# Patient Record
Sex: MALE | URBAN - METROPOLITAN AREA
[De-identification: names, ages, dates, MRNs, and addresses within clinical notes are randomized per-mention and may not be internally consistent; named-entity substitution may affect disease eponyms.]

---

## 2021-08-05 ENCOUNTER — TELEPHONE (OUTPATIENT)
Dept: ENDOCRINOLOGY CLINIC | Facility: CLINIC | Age: 46
End: 2021-08-05

## 2021-08-05 NOTE — TELEPHONE ENCOUNTER
Referral received in office 08/03/21 for Dr Sarmad Ramey. No appointment scheduled as of 08/05/21. Please follow up wit patient to schedule new consult. Referral placed in referral folder at . Thank You.

## 2021-10-12 ENCOUNTER — TELEMEDICINE (OUTPATIENT)
Dept: ENDOCRINOLOGY CLINIC | Facility: CLINIC | Age: 46
End: 2021-10-12

## 2021-10-12 DIAGNOSIS — Z79.4 UNCONTROLLED TYPE 2 DIABETES MELLITUS WITH HYPERGLYCEMIA, WITH LONG-TERM CURRENT USE OF INSULIN (HCC): Primary | ICD-10-CM

## 2021-10-12 DIAGNOSIS — E11.65 UNCONTROLLED TYPE 2 DIABETES MELLITUS WITH HYPERGLYCEMIA, WITH LONG-TERM CURRENT USE OF INSULIN (HCC): Primary | ICD-10-CM

## 2021-10-12 PROCEDURE — 99204 OFFICE O/P NEW MOD 45 MIN: CPT | Performed by: NURSE PRACTITIONER

## 2021-10-12 RX ORDER — SEMAGLUTIDE 1.34 MG/ML
0.25 INJECTION, SOLUTION SUBCUTANEOUS WEEKLY
Qty: 4.5 ML | Refills: 0 | Status: SHIPPED | OUTPATIENT
Start: 2021-10-12 | End: 2021-11-22

## 2021-10-12 NOTE — PROGRESS NOTES
This visit is conducted using Telemedicine with live, interactive video and audio. Name: Rene Hernandez  Date: 10/12/2021    Referring Physician: No ref. provider found    CHIEF COMPLAINT   No chief complaint on file.       HISTORY OF PRESENT [place] 12pm: chicken with vege (brocolli or carrots or potatoes or green beans) with fruit (apple or orange or pears or grapes)  Dinner around 4-5pm: fish with vege or chicken with rice   Snack: 1 and 1/2 cups of cereal with 1 cup milk 1% - on occasion     -does (1,000 mg total) by mouth 2 (two) times daily with meals. , Disp: 180 tablet, Rfl: 0  •  Semaglutide,0.25 or 0.5MG/DOS, (OZEMPIC, 0.25 OR 0.5 MG/DOSE,) 2 MG/1.5ML Subcutaneous Solution Pen-injector, Inject 0.25 mg into the skin once a week., Disp: 4.5 mL, R HbA,C: 9.0% on 9/30/21  a) Medications  -continue with Metformin 500mg twice daily   -start taking Jardiance 25mg once daily in AM  -continue with Levemir 50 unit once daily at bedtime   -start Ozempic 0.25mg once weekly - risks and benefits reviewed     - 10/12/2021  MILES Moran    Please note that the following visit was completed using two-way, real-time interactive audio and/or video communication. There are limitations of this visit as no physical exam could be performed.  Every conscious e

## 2021-10-13 RX ORDER — EMPAGLIFLOZIN 25 MG/1
1 TABLET, FILM COATED ORAL DAILY
COMMUNITY
Start: 2021-10-06

## 2021-10-13 RX ORDER — LISINOPRIL 10 MG/1
10 TABLET ORAL DAILY
COMMUNITY
Start: 2021-09-30 | End: 2022-09-25

## 2021-10-13 RX ORDER — PEN NEEDLE, DIABETIC 31 GX5/16"
1 NEEDLE, DISPOSABLE MISCELLANEOUS NIGHTLY
COMMUNITY
Start: 2021-10-01

## 2021-10-13 NOTE — PATIENT INSTRUCTIONS
A1C: 9.0% on 9/30/2021    Medications:   -continue with Metformin 500mg twice daily   -start taking Jardiance 25mg once daily in AM  -continue with Levemir 50 unit once daily at bedtime   -start Ozempic 0.25mg once weekly   Common side effects:    Nausea o

## 2021-11-22 ENCOUNTER — OFFICE VISIT (OUTPATIENT)
Dept: ENDOCRINOLOGY CLINIC | Facility: CLINIC | Age: 46
End: 2021-11-22
Payer: COMMERCIAL

## 2021-11-22 VITALS — SYSTOLIC BLOOD PRESSURE: 153 MMHG | HEART RATE: 74 BPM | WEIGHT: 188 LBS | DIASTOLIC BLOOD PRESSURE: 92 MMHG

## 2021-11-22 DIAGNOSIS — E11.65 UNCONTROLLED TYPE 2 DIABETES MELLITUS WITH HYPERGLYCEMIA, WITH LONG-TERM CURRENT USE OF INSULIN (HCC): Primary | ICD-10-CM

## 2021-11-22 DIAGNOSIS — Z79.4 UNCONTROLLED TYPE 2 DIABETES MELLITUS WITH HYPERGLYCEMIA, WITH LONG-TERM CURRENT USE OF INSULIN (HCC): Primary | ICD-10-CM

## 2021-11-22 PROCEDURE — 3052F HG A1C>EQUAL 8.0%<EQUAL 9.0%: CPT | Performed by: NURSE PRACTITIONER

## 2021-11-22 PROCEDURE — 83036 HEMOGLOBIN GLYCOSYLATED A1C: CPT | Performed by: NURSE PRACTITIONER

## 2021-11-22 PROCEDURE — 82947 ASSAY GLUCOSE BLOOD QUANT: CPT | Performed by: NURSE PRACTITIONER

## 2021-11-22 PROCEDURE — 3077F SYST BP >= 140 MM HG: CPT | Performed by: NURSE PRACTITIONER

## 2021-11-22 PROCEDURE — 36416 COLLJ CAPILLARY BLOOD SPEC: CPT | Performed by: NURSE PRACTITIONER

## 2021-11-22 PROCEDURE — 3080F DIAST BP >= 90 MM HG: CPT | Performed by: NURSE PRACTITIONER

## 2021-11-22 PROCEDURE — 99214 OFFICE O/P EST MOD 30 MIN: CPT | Performed by: NURSE PRACTITIONER

## 2021-11-22 RX ORDER — SEMAGLUTIDE 1.34 MG/ML
0.25 INJECTION, SOLUTION SUBCUTANEOUS WEEKLY
Qty: 4.5 ML | Refills: 0 | Status: SHIPPED | OUTPATIENT
Start: 2021-11-22

## 2021-11-22 NOTE — PROGRESS NOTES
Name: Rene Hernandez  Date: 11/22/2021    CHIEF COMPLAINT   No chief complaint on file. HISTORY OF PRESENT ILLNESS   Rene Hernandez is a 40year old male who presents for follow up on diabetes management. Hgb A1C: 8.0% at [address] today. SYSTEMS  Constitutional: Negative for: weight change, fever, fatigue, cold/heat intolerance  Eyes: Negative for:  Visual changes, proptosis, blurring  ENT: Negative for:  dysphagia, neck swelling, dysphonia  Respiratory: Negative for: hemoptysis, shortness Alcohol use: Not Currently      Drug use: Never   Social History    Socioeconomic History      Marital status:     Tobacco Use      Smoking status: Former Smoker        Quit date: 10/1/1991        Years since quittin.1      Smokeless tobacco: N of the importance of glycemic control and the goals of therapy.   -Discussed with patient glucose targets ranges (Fasting  and post prandial <180).      1.Type 2 Diabetes Mellitus, uncontrolled, with hyperglycemia   -LAB DATA  HbA,C: 8.0% today -->dec verbalizes understanding of these issues and agrees to the plan.     11/22/2021  MILES Elizondo

## 2021-11-22 NOTE — PATIENT INSTRUCTIONS
A1C: 8.0% today -->decreased from 9.0% on 9/30/2021  Blood glucose: 242 in clinic today    Medications:   -continue with Metformin 500mg twice daily   -continue with Jardiance 25mg once daily in AM  -continue with Levemir 50 unit once daily at bedtime   -s

## 2021-11-30 ENCOUNTER — TELEPHONE (OUTPATIENT)
Dept: ENDOCRINOLOGY CLINIC | Facility: CLINIC | Age: 46
End: 2021-11-30

## 2021-11-30 NOTE — TELEPHONE ENCOUNTER
Current Outpatient Medications   Medication Sig Dispense Refill   • Semaglutide,0.25 or 0.5MG/DOS, (OZEMPIC, 0.25 OR 0.5 MG/DOSE,) 2 MG/1.5ML Subcutaneous Solution Pen-injector Inject 0.25 mg into the skin once a week.  4.5 mL 0      Key: [id number]

## 2021-11-30 NOTE — TELEPHONE ENCOUNTER
Medication PA Requested:Semaglutide,0.25 or 0.5MG/DOS, (OZEMPIC, 0.25 OR 0.5 MG/DOSE,) 2 MG/1.5ML Subcutaneous Solution Pen-injector                                                          CoverMyMeds Used:  Key:  Quantity: 4.5 mL  Day Supply: 90  Sig: In

## 2021-12-02 ENCOUNTER — NURSE ONLY (OUTPATIENT)
Dept: ENDOCRINOLOGY CLINIC | Facility: CLINIC | Age: 46
End: 2021-12-02
Payer: COMMERCIAL

## 2021-12-02 NOTE — PATIENT INSTRUCTIONS
1. Eat 45 grams of carbohydrates at each meal   Reduced sandwiches and amount of rice and beans  2. Keep food and blood sugar records for 4 days and bring to next appointment  3.  Carry your blood sugar meter with you at all times and carry treatment for lo

## 2021-12-02 NOTE — PROGRESS NOTES
Lindy [email or web address] attended individual initial assessment for Diabetes Education:    Date: 12/2/2021         Start time: 2:15 pm End time: 2:50 pm    HEMOGLOBIN A1C (%)   Date Value   11/22/2021 8.0 (A)       Assessment:   Sascha Bolden reports he has Active:   Encouraged Physical Activity and briefly reviewed ADA recommended guidelines for activity with type 2 diabetes.         Monitoring:  Instructed/reinforced blood glucose monitoring, testing schedules and target goals:   Fasting / Pre-meal  mg

## 2021-12-13 NOTE — TELEPHONE ENCOUNTER
[facility], [phone], spoke with [facility] SHMUEL LAW/pa rep. She states PA for Ozempic faxed on 12/3 is not seen in her system. She will have the 'senior line' reach out to me since there is no specific form. Gave my phone contact info.    Call ref # Michael [id number]

## 2021-12-14 NOTE — TELEPHONE ENCOUNTER
Received fax from [place] [phone] stating that PA for Shantelle Belle was not processed because they do not process PA for the patient's prescription benefit program.     Routing as nadege.

## 2021-12-14 NOTE — TELEPHONE ENCOUNTER
[id number] Catrachita Kong noted. Since cost would be an ongoing issue, I don't think the 1 month free supply for trulicity would work. Please offer information for [facility] program.     Thank you!

## 2021-12-14 NOTE — TELEPHONE ENCOUNTER
Confirmed Key in initial TE note has [employer] as benefit in Cover My Meds. [employer], [phone], spoke with Jamila Walls, She does not see any private insurance, Jennifer Frederick has [employer] card. \"   Please confirm with patient who there pharm

## 2021-12-14 NOTE — TELEPHONE ENCOUNTER
Spoke to patient regarding PA denial for Ozempic. Patient confirmed that he does not have prescription insurance. Gave him information for Patient assistance program over the phone.  Advised patient to see if he qualifies for the program. Patient stated he

## 2021-12-14 NOTE — TELEPHONE ENCOUNTER
Shirin Heads,     Please advise. I tried to contact patient however wife said he is working. Per chart, patient does not have prescription insurance. Can patient try 1 month free of Trulicity?

## 2021-12-30 ENCOUNTER — NURSE ONLY (OUTPATIENT)
Dept: ENDOCRINOLOGY CLINIC | Facility: CLINIC | Age: 46
End: 2021-12-30
Payer: COMMERCIAL

## 2021-12-30 NOTE — PATIENT INSTRUCTIONS
[email or web address]    Monitor blood sugar daily-in the morning when you wake up or at bedtime when you administer you insulin.     Carry BG meter with you during work hours to assist with low blood sugar treatmen

## 2021-12-30 NOTE — PROGRESS NOTES
Mitchell Cordero  : 1975 attended appointment  for Diabetes Education:    Date: 2021   Start time: 4: 02 pm End time: 4 :27 pm      Patrick Lord came from work and did not provide food records as requested.  He states he is consuming more salads

## 2022-02-23 ENCOUNTER — OFFICE VISIT (OUTPATIENT)
Dept: ENDOCRINOLOGY CLINIC | Facility: CLINIC | Age: 47
End: 2022-02-23
Payer: COMMERCIAL

## 2022-02-23 VITALS
HEART RATE: 78 BPM | HEIGHT: 67 IN | BODY MASS INDEX: 28.88 KG/M2 | DIASTOLIC BLOOD PRESSURE: 85 MMHG | SYSTOLIC BLOOD PRESSURE: 145 MMHG | WEIGHT: 184 LBS

## 2022-02-23 DIAGNOSIS — Z79.4 UNCONTROLLED TYPE 2 DIABETES MELLITUS WITH HYPERGLYCEMIA, WITH LONG-TERM CURRENT USE OF INSULIN (HCC): Primary | ICD-10-CM

## 2022-02-23 DIAGNOSIS — E11.65 UNCONTROLLED TYPE 2 DIABETES MELLITUS WITH HYPERGLYCEMIA, WITH LONG-TERM CURRENT USE OF INSULIN (HCC): Primary | ICD-10-CM

## 2022-02-23 LAB
CARTRIDGE LOT#: ABNORMAL NUMERIC
GLUCOSE BLOOD: 135
HEMOGLOBIN A1C: 7.2 % (ref 4.3–5.6)
TEST STRIP LOT #: NORMAL NUMERIC

## 2022-02-23 PROCEDURE — 3077F SYST BP >= 140 MM HG: CPT | Performed by: NURSE PRACTITIONER

## 2022-02-23 PROCEDURE — 3051F HG A1C>EQUAL 7.0%<8.0%: CPT | Performed by: NURSE PRACTITIONER

## 2022-02-23 PROCEDURE — 83036 HEMOGLOBIN GLYCOSYLATED A1C: CPT | Performed by: NURSE PRACTITIONER

## 2022-02-23 PROCEDURE — 36416 COLLJ CAPILLARY BLOOD SPEC: CPT | Performed by: NURSE PRACTITIONER

## 2022-02-23 PROCEDURE — 82947 ASSAY GLUCOSE BLOOD QUANT: CPT | Performed by: NURSE PRACTITIONER

## 2022-02-23 PROCEDURE — 3079F DIAST BP 80-89 MM HG: CPT | Performed by: NURSE PRACTITIONER

## 2022-02-23 PROCEDURE — 3008F BODY MASS INDEX DOCD: CPT | Performed by: NURSE PRACTITIONER

## 2022-02-23 PROCEDURE — 99214 OFFICE O/P EST MOD 30 MIN: CPT | Performed by: NURSE PRACTITIONER

## 2022-02-23 RX ORDER — CLOTRIMAZOLE 1 %
1 CREAM (GRAM) TOPICAL 2 TIMES DAILY
Qty: 45 G | Refills: 1 | Status: SHIPPED | OUTPATIENT
Start: 2022-02-23

## 2022-02-23 RX ORDER — GLIPIZIDE 5 MG/1
5 TABLET ORAL
Qty: 90 TABLET | Refills: 0 | Status: SHIPPED | OUTPATIENT
Start: 2022-02-23 | End: 2022-05-24

## 2022-02-23 NOTE — PATIENT INSTRUCTIONS
A1C: 7.2% today -->decreased from 8.0% on 11/22/2021  Blood glucose: 135 in clinic today    Medications:   -continue with Metformin 1,000mg twice daily   -continue with Jardiance 25mg once daily in AM  -continue with Levemir 50 unit once daily at bedtime   -start glipizide 5mg with dinner meal    - if you eat a small dinner or no dinner, then do not take glipizide     A1C goal:  <7.0%    Blood sugar testing:  Test your blood sugar 1 time daily   Recommended times to test: Before breakfast (fasting) or before lunch or before dinner     Blood sugar targets:  Before breakfast:   (preferably < 110)  Before meals OR 2 hours after meals: <150    Call for persistent blood sugars < 75 or > 200.

## 2022-05-23 ENCOUNTER — OFFICE VISIT (OUTPATIENT)
Dept: ENDOCRINOLOGY CLINIC | Facility: CLINIC | Age: 47
End: 2022-05-23
Payer: COMMERCIAL

## 2022-05-23 VITALS
WEIGHT: 185.5 LBS | DIASTOLIC BLOOD PRESSURE: 100 MMHG | HEART RATE: 74 BPM | SYSTOLIC BLOOD PRESSURE: 160 MMHG | BODY MASS INDEX: 29 KG/M2

## 2022-05-23 DIAGNOSIS — Z79.4 UNCONTROLLED TYPE 2 DIABETES MELLITUS WITH HYPERGLYCEMIA, WITH LONG-TERM CURRENT USE OF INSULIN (HCC): Primary | ICD-10-CM

## 2022-05-23 DIAGNOSIS — E11.65 UNCONTROLLED TYPE 2 DIABETES MELLITUS WITH HYPERGLYCEMIA, WITH LONG-TERM CURRENT USE OF INSULIN (HCC): Primary | ICD-10-CM

## 2022-05-23 DIAGNOSIS — I10 PRIMARY HYPERTENSION: ICD-10-CM

## 2022-05-23 LAB
CARTRIDGE LOT#: ABNORMAL NUMERIC
GLUCOSE BLOOD: 255
HEMOGLOBIN A1C: 7.4 % (ref 4.3–5.6)
TEST STRIP LOT #: NORMAL NUMERIC

## 2022-05-23 RX ORDER — LISINOPRIL 20 MG/1
20 TABLET ORAL DAILY
Qty: 90 TABLET | Refills: 0 | Status: SHIPPED | OUTPATIENT
Start: 2022-05-23 | End: 2022-08-21

## 2022-05-23 NOTE — PATIENT INSTRUCTIONS
A1C: 7.4% today --> slight increase from 7.2% on 2/23/2022  Blood glucose: 255 in clinic today    Medications:   -continue with Metformin 1,000mg twice daily   -continue with Jardiance 25mg once daily in AM  -continue with Levemir 50 unit once daily at bedtime   -start glipizide 2.5mg (1/2 tablet) with dinner   -increase lisinopril 10-->20mg once daily in AM    -Switch regular white bread for whole wheat bread   -switch regular cookies for sugar free cookies    Weight:  Wt Readings from Last 6 Encounters:  05/23/22 : 185 lb 8 oz (84.1 kg)  02/23/22 : 184 lb (83.5 kg)  11/22/21 : 188 lb (85.3 kg)    A1C goal:  <7.0%    Blood sugar testing:  Test your blood sugar 1 time daily   Recommended times to test: alternate with before breakfast (fasting) or before dinner or bedtime    Blood sugar targets:  Before breakfast:   (preferably < 110)  Before meals OR 2 hours after meals: <180 (preferably <150)     Call for persistent blood sugars < 75 or > 200

## 2022-07-18 RX ORDER — LISINOPRIL 20 MG/1
TABLET ORAL
Qty: 90 TABLET | Refills: 0 | Status: SHIPPED | OUTPATIENT
Start: 2022-07-18

## 2022-08-24 ENCOUNTER — OFFICE VISIT (OUTPATIENT)
Dept: ENDOCRINOLOGY CLINIC | Facility: CLINIC | Age: 47
End: 2022-08-24
Payer: MEDICAID

## 2022-08-24 VITALS
HEIGHT: 67 IN | DIASTOLIC BLOOD PRESSURE: 82 MMHG | BODY MASS INDEX: 28.72 KG/M2 | HEART RATE: 70 BPM | WEIGHT: 183 LBS | SYSTOLIC BLOOD PRESSURE: 124 MMHG

## 2022-08-24 DIAGNOSIS — Z79.4 UNCONTROLLED TYPE 2 DIABETES MELLITUS WITH HYPERGLYCEMIA, WITH LONG-TERM CURRENT USE OF INSULIN (HCC): Primary | ICD-10-CM

## 2022-08-24 DIAGNOSIS — E11.65 UNCONTROLLED TYPE 2 DIABETES MELLITUS WITH HYPERGLYCEMIA, WITH LONG-TERM CURRENT USE OF INSULIN (HCC): Primary | ICD-10-CM

## 2022-08-24 LAB
CARTRIDGE LOT#: ABNORMAL NUMERIC
HEMOGLOBIN A1C: 8.2 % (ref 4.3–5.6)

## 2022-08-24 PROCEDURE — 83036 HEMOGLOBIN GLYCOSYLATED A1C: CPT | Performed by: NURSE PRACTITIONER

## 2022-08-24 PROCEDURE — 3008F BODY MASS INDEX DOCD: CPT | Performed by: NURSE PRACTITIONER

## 2022-08-24 PROCEDURE — 3079F DIAST BP 80-89 MM HG: CPT | Performed by: NURSE PRACTITIONER

## 2022-08-24 PROCEDURE — 3074F SYST BP LT 130 MM HG: CPT | Performed by: NURSE PRACTITIONER

## 2022-08-24 PROCEDURE — 99214 OFFICE O/P EST MOD 30 MIN: CPT | Performed by: NURSE PRACTITIONER

## 2022-08-24 PROCEDURE — 3052F HG A1C>EQUAL 8.0%<EQUAL 9.0%: CPT | Performed by: NURSE PRACTITIONER

## 2022-08-24 RX ORDER — DULAGLUTIDE 1.5 MG/.5ML
1.5 INJECTION, SOLUTION SUBCUTANEOUS WEEKLY
Qty: 2 ML | Refills: 0 | Status: SHIPPED | OUTPATIENT
Start: 2022-08-24

## 2022-08-24 RX ORDER — DULAGLUTIDE 0.75 MG/.5ML
0.75 INJECTION, SOLUTION SUBCUTANEOUS WEEKLY
Qty: 2 ML | Refills: 0 | Status: SHIPPED | OUTPATIENT
Start: 2022-08-24

## 2022-08-24 RX ORDER — GLIPIZIDE 5 MG/1
5 TABLET ORAL NIGHTLY
COMMUNITY
Start: 2022-08-01 | End: 2022-08-24

## 2022-08-24 NOTE — PATIENT INSTRUCTIONS
A1C: 8.2% today -->increased from 7.4% on 5/23/2022    Medications:   -continue with Metformin 1,000mg twice daily   -continue with Jardiance 25mg once daily in AM  -continue with Levemir 50 unit once daily at bedtime   -start Trulicity 2.60WP once weekly for 4 weeks, then increase 1.5mg one weekly       Weight:  Wt Readings from Last 6 Encounters:  08/24/22 : 183 lb (83 kg)  05/23/22 : 185 lb 8 oz (84.1 kg)  02/23/22 : 184 lb (83.5 kg)  11/22/21 : 188 lb (85.3 kg)    A1C goal:  <7.0%    Blood sugar testing:  Test your blood sugar 1 time daily   Recommended times to test: Before breakfast (fasting) or 2hrs after meals     Blood sugar targets:  Before breakfast:   (preferably < 110)  Before meals OR 2 hours after meals: <150    Call for persistent blood sugars < 75 or > 200

## 2022-08-30 ENCOUNTER — TELEPHONE (OUTPATIENT)
Dept: ENDOCRINOLOGY CLINIC | Facility: CLINIC | Age: 47
End: 2022-08-30

## 2022-09-20 RX ORDER — DULAGLUTIDE 0.75 MG/.5ML
INJECTION, SOLUTION SUBCUTANEOUS
Qty: 2 ML | Refills: 0 | Status: SHIPPED | OUTPATIENT
Start: 2022-09-20

## 2022-10-14 RX ORDER — DULAGLUTIDE 0.75 MG/.5ML
INJECTION, SOLUTION SUBCUTANEOUS
Qty: 2 ML | Refills: 0 | Status: SHIPPED | OUTPATIENT
Start: 2022-10-14

## 2022-10-24 ENCOUNTER — OFFICE VISIT (OUTPATIENT)
Dept: ENDOCRINOLOGY CLINIC | Facility: CLINIC | Age: 47
End: 2022-10-24
Payer: MEDICAID

## 2022-10-24 VITALS
HEART RATE: 83 BPM | WEIGHT: 184 LBS | SYSTOLIC BLOOD PRESSURE: 129 MMHG | BODY MASS INDEX: 29 KG/M2 | DIASTOLIC BLOOD PRESSURE: 83 MMHG

## 2022-10-24 DIAGNOSIS — E11.9 TYPE 2 DIABETES MELLITUS WITHOUT COMPLICATION, WITH LONG-TERM CURRENT USE OF INSULIN (HCC): Primary | ICD-10-CM

## 2022-10-24 DIAGNOSIS — Z79.4 TYPE 2 DIABETES MELLITUS WITHOUT COMPLICATION, WITH LONG-TERM CURRENT USE OF INSULIN (HCC): Primary | ICD-10-CM

## 2022-10-24 DIAGNOSIS — E78.5 HYPERLIPIDEMIA, UNSPECIFIED HYPERLIPIDEMIA TYPE: ICD-10-CM

## 2022-10-24 LAB
CARTRIDGE LOT#: NORMAL NUMERIC
GLUCOSE BLOOD: 117
TEST STRIP LOT #: NORMAL NUMERIC

## 2022-10-24 PROCEDURE — 99214 OFFICE O/P EST MOD 30 MIN: CPT | Performed by: NURSE PRACTITIONER

## 2022-10-24 PROCEDURE — 3079F DIAST BP 80-89 MM HG: CPT | Performed by: NURSE PRACTITIONER

## 2022-10-24 PROCEDURE — 83036 HEMOGLOBIN GLYCOSYLATED A1C: CPT | Performed by: NURSE PRACTITIONER

## 2022-10-24 PROCEDURE — 3074F SYST BP LT 130 MM HG: CPT | Performed by: NURSE PRACTITIONER

## 2022-10-24 PROCEDURE — 82947 ASSAY GLUCOSE BLOOD QUANT: CPT | Performed by: NURSE PRACTITIONER

## 2022-10-24 PROCEDURE — 3044F HG A1C LEVEL LT 7.0%: CPT | Performed by: NURSE PRACTITIONER

## 2022-10-24 RX ORDER — EMPAGLIFLOZIN 25 MG/1
1 TABLET, FILM COATED ORAL DAILY
Qty: 90 TABLET | Refills: 0 | Status: SHIPPED | OUTPATIENT
Start: 2022-10-24

## 2022-10-24 RX ORDER — DULAGLUTIDE 0.75 MG/.5ML
0.75 INJECTION, SOLUTION SUBCUTANEOUS WEEKLY
Qty: 6 ML | Refills: 0 | Status: SHIPPED | OUTPATIENT
Start: 2022-10-24

## 2023-01-16 RX ORDER — DULAGLUTIDE 0.75 MG/.5ML
0.75 INJECTION, SOLUTION SUBCUTANEOUS WEEKLY
Qty: 6 ML | Refills: 0 | Status: SHIPPED | OUTPATIENT
Start: 2023-01-16

## 2023-01-16 NOTE — TELEPHONE ENCOUNTER
LOV: 10/24/22    Future Appointments   Date Time Provider Monika Lombardo   1/25/2023  2:30 PM MILES MathisO JUNE MCMULLENO

## 2023-01-25 ENCOUNTER — OFFICE VISIT (OUTPATIENT)
Dept: ENDOCRINOLOGY CLINIC | Facility: CLINIC | Age: 48
End: 2023-01-25

## 2023-01-25 VITALS
WEIGHT: 183 LBS | SYSTOLIC BLOOD PRESSURE: 135 MMHG | DIASTOLIC BLOOD PRESSURE: 85 MMHG | HEART RATE: 79 BPM | BODY MASS INDEX: 29 KG/M2

## 2023-01-25 DIAGNOSIS — E11.65 TYPE 2 DIABETES MELLITUS WITH HYPERGLYCEMIA, WITH LONG-TERM CURRENT USE OF INSULIN (HCC): Primary | ICD-10-CM

## 2023-01-25 DIAGNOSIS — Z79.4 TYPE 2 DIABETES MELLITUS WITH HYPERGLYCEMIA, WITH LONG-TERM CURRENT USE OF INSULIN (HCC): Primary | ICD-10-CM

## 2023-01-25 LAB
CARTRIDGE LOT#: ABNORMAL NUMERIC
GLUCOSE BLOOD: 135
HEMOGLOBIN A1C: 6.5 % (ref 4.3–5.6)
TEST STRIP LOT #: NORMAL NUMERIC

## 2023-01-25 PROCEDURE — 99214 OFFICE O/P EST MOD 30 MIN: CPT | Performed by: NURSE PRACTITIONER

## 2023-01-25 PROCEDURE — 3044F HG A1C LEVEL LT 7.0%: CPT | Performed by: NURSE PRACTITIONER

## 2023-01-25 PROCEDURE — 3075F SYST BP GE 130 - 139MM HG: CPT | Performed by: NURSE PRACTITIONER

## 2023-01-25 PROCEDURE — 3079F DIAST BP 80-89 MM HG: CPT | Performed by: NURSE PRACTITIONER

## 2023-01-25 PROCEDURE — 83036 HEMOGLOBIN GLYCOSYLATED A1C: CPT | Performed by: NURSE PRACTITIONER

## 2023-01-25 PROCEDURE — 82947 ASSAY GLUCOSE BLOOD QUANT: CPT | Performed by: NURSE PRACTITIONER

## 2023-01-25 RX ORDER — LANCETS 33 GAUGE
1 EACH MISCELLANEOUS 2 TIMES DAILY
Qty: 200 EACH | Refills: 1 | Status: SHIPPED | OUTPATIENT
Start: 2023-01-25 | End: 2023-01-27

## 2023-01-25 RX ORDER — DULAGLUTIDE 1.5 MG/.5ML
1.5 INJECTION, SOLUTION SUBCUTANEOUS WEEKLY
Qty: 6 ML | Refills: 0 | Status: SHIPPED | OUTPATIENT
Start: 2023-02-15

## 2023-01-25 RX ORDER — BLOOD SUGAR DIAGNOSTIC
STRIP MISCELLANEOUS
Qty: 200 STRIP | Refills: 1 | Status: SHIPPED | OUTPATIENT
Start: 2023-01-25 | End: 2023-01-27

## 2023-01-25 RX ORDER — LOVASTATIN 10 MG/1
10 TABLET ORAL NIGHTLY
COMMUNITY
Start: 2022-12-14 | End: 2023-03-14

## 2023-01-25 NOTE — PATIENT INSTRUCTIONS
A1C: 6.5% today -->slight decrease from 6.7% on 10/24/2022  Blood glucose: 135 in clinic today  Endocrinology fax# [phone]    Medications:   -continue with Metformin 1,000mg twice daily   -continue with Jardiance 25mg once daily in AM  -continue with Levemir 50 unit once daily at bedtime   -increase Trulicity 4.38 -->5.0DM once weekly on wednesdays     A1C goal:  <7.0%    Blood sugar testing:  Test your blood sugar 1 time daily   Recommended times to test: alternate with before breakfast (fasting) or before dinner or bedtime     Blood sugar targets:  Before breakfast:   (preferably < 110)  Before meals OR 2 hours after meals: <150    Call for persistent blood sugars < 75 or > 200

## 2023-01-27 ENCOUNTER — TELEPHONE (OUTPATIENT)
Dept: ENDOCRINOLOGY CLINIC | Facility: CLINIC | Age: 48
End: 2023-01-27

## 2023-01-27 RX ORDER — LANCETS 33 GAUGE
1 EACH MISCELLANEOUS 2 TIMES DAILY
Qty: 200 EACH | Refills: 1 | Status: SHIPPED | OUTPATIENT
Start: 2023-01-27

## 2023-01-27 RX ORDER — BLOOD SUGAR DIAGNOSTIC
STRIP MISCELLANEOUS
Qty: 200 STRIP | Refills: 1 | Status: SHIPPED | OUTPATIENT
Start: 2023-01-27

## 2023-02-20 ENCOUNTER — TELEPHONE (OUTPATIENT)
Dept: ENDOCRINOLOGY CLINIC | Facility: CLINIC | Age: 48
End: 2023-02-20

## 2023-02-20 RX ORDER — EMPAGLIFLOZIN 25 MG/1
1 TABLET, FILM COATED ORAL DAILY
Qty: 90 TABLET | Refills: 1 | Status: SHIPPED | OUTPATIENT
Start: 2023-02-20

## 2023-02-20 NOTE — TELEPHONE ENCOUNTER
Called patient to confirm what medication he needs as there are other meds list as below. Pt states he just needs jardiance. Pended script for 90 days + 1 refill.

## 2023-03-24 RX ORDER — DULAGLUTIDE 1.5 MG/.5ML
INJECTION, SOLUTION SUBCUTANEOUS
Qty: 6 ML | Refills: 0 | Status: SHIPPED | OUTPATIENT
Start: 2023-03-24

## 2023-04-24 ENCOUNTER — TELEPHONE (OUTPATIENT)
Dept: ENDOCRINOLOGY CLINIC | Facility: CLINIC | Age: 48
End: 2023-04-24

## 2023-04-24 DIAGNOSIS — Z79.4 TYPE 2 DIABETES MELLITUS WITH HYPERGLYCEMIA, WITH LONG-TERM CURRENT USE OF INSULIN (HCC): Primary | ICD-10-CM

## 2023-04-24 DIAGNOSIS — E11.65 TYPE 2 DIABETES MELLITUS WITH HYPERGLYCEMIA, WITH LONG-TERM CURRENT USE OF INSULIN (HCC): Primary | ICD-10-CM

## 2023-04-24 NOTE — TELEPHONE ENCOUNTER
Spoke to patient to advise fasting lab orders in system to be completed prior to f/u on 4/26/23 - patient stated understanding and will try to go to lab in [place] tomorrow (hours reviewed with patient)

## 2023-04-26 ENCOUNTER — LAB ENCOUNTER (OUTPATIENT)
Dept: LAB | Age: 48
End: 2023-04-26
Attending: NURSE PRACTITIONER
Payer: MEDICAID

## 2023-04-26 ENCOUNTER — OFFICE VISIT (OUTPATIENT)
Dept: ENDOCRINOLOGY CLINIC | Facility: CLINIC | Age: 48
End: 2023-04-26

## 2023-04-26 VITALS
BODY MASS INDEX: 29 KG/M2 | DIASTOLIC BLOOD PRESSURE: 85 MMHG | WEIGHT: 185 LBS | SYSTOLIC BLOOD PRESSURE: 143 MMHG | HEART RATE: 78 BPM

## 2023-04-26 DIAGNOSIS — Z79.4 TYPE 2 DIABETES MELLITUS WITH HYPERGLYCEMIA, WITH LONG-TERM CURRENT USE OF INSULIN (HCC): ICD-10-CM

## 2023-04-26 DIAGNOSIS — E11.9 TYPE 2 DIABETES MELLITUS WITHOUT COMPLICATION, WITH LONG-TERM CURRENT USE OF INSULIN (HCC): Primary | ICD-10-CM

## 2023-04-26 DIAGNOSIS — E11.65 TYPE 2 DIABETES MELLITUS WITH HYPERGLYCEMIA, WITH LONG-TERM CURRENT USE OF INSULIN (HCC): ICD-10-CM

## 2023-04-26 DIAGNOSIS — Z79.4 TYPE 2 DIABETES MELLITUS WITHOUT COMPLICATION, WITH LONG-TERM CURRENT USE OF INSULIN (HCC): Primary | ICD-10-CM

## 2023-04-26 LAB
CHOLEST SERPL-MCNC: 108 MG/DL (ref ?–200)
EST. AVERAGE GLUCOSE BLD GHB EST-MCNC: 131 MG/DL (ref 68–126)
FASTING PATIENT LIPID ANSWER: YES
GLUCOSE BLOOD: 140
HBA1C MFR BLD: 6.2 % (ref ?–5.7)
HDLC SERPL-MCNC: 33 MG/DL (ref 40–59)
LDLC SERPL CALC-MCNC: 61 MG/DL (ref ?–100)
NONHDLC SERPL-MCNC: 75 MG/DL (ref ?–130)
TEST STRIP LOT #: NORMAL NUMERIC
TRIGL SERPL-MCNC: 62 MG/DL (ref 30–149)
VLDLC SERPL CALC-MCNC: 9 MG/DL (ref 0–30)

## 2023-04-26 PROCEDURE — 82947 ASSAY GLUCOSE BLOOD QUANT: CPT | Performed by: NURSE PRACTITIONER

## 2023-04-26 PROCEDURE — 3044F HG A1C LEVEL LT 7.0%: CPT | Performed by: NURSE PRACTITIONER

## 2023-04-26 PROCEDURE — 83036 HEMOGLOBIN GLYCOSYLATED A1C: CPT

## 2023-04-26 PROCEDURE — 3077F SYST BP >= 140 MM HG: CPT | Performed by: NURSE PRACTITIONER

## 2023-04-26 PROCEDURE — 99213 OFFICE O/P EST LOW 20 MIN: CPT | Performed by: NURSE PRACTITIONER

## 2023-04-26 PROCEDURE — 80061 LIPID PANEL: CPT

## 2023-04-26 PROCEDURE — 3079F DIAST BP 80-89 MM HG: CPT | Performed by: NURSE PRACTITIONER

## 2023-04-26 PROCEDURE — 36415 COLL VENOUS BLD VENIPUNCTURE: CPT

## 2023-04-26 RX ORDER — EMPAGLIFLOZIN 25 MG/1
1 TABLET, FILM COATED ORAL DAILY
Qty: 90 TABLET | Refills: 1 | Status: SHIPPED | OUTPATIENT
Start: 2023-04-26

## 2023-04-26 RX ORDER — LISINOPRIL 20 MG/1
20 TABLET ORAL DAILY
Qty: 90 TABLET | Refills: 1 | Status: SHIPPED | OUTPATIENT
Start: 2023-04-26

## 2023-04-26 NOTE — PATIENT INSTRUCTIONS
A1C: 6.2% today -->decreased 6.5% on 1/25/2023  Blood glucose: 140 in clinic today    Medications:   -continue with Metformin 1,000mg twice daily   -continue with Jardiance 25mg once daily in AM  -decrease Levemir 50-->46 unit once daily at bedtime   - continue with Trulicity 7.5HA once weekly on wednesdays    Weight:  Wt Readings from Last 6 Encounters:  04/26/23 : 185 lb (83.9 kg)  01/25/23 : 183 lb (83 kg)  10/24/22 : 184 lb (83.5 kg)  08/24/22 : 183 lb (83 kg)  05/23/22 : 185 lb 8 oz (84.1 kg)  02/23/22 : 184 lb (83.5 kg)    A1C goal:  <7.0%    Blood sugar testing:  Test your blood sugar 1 time daily   Recommended times to test: alternate with before breakfast (fasting) or 2hrs after meals     Blood sugar targets:  Before breakfast:  (preferably < 110)  2 hours after meals: <150    Call for persistent blood sugars < 75 or > 200

## 2023-05-24 RX ORDER — LANCETS 33 GAUGE
EACH MISCELLANEOUS
Qty: 200 EACH | Refills: 1 | Status: SHIPPED | OUTPATIENT
Start: 2023-05-24

## 2023-07-10 ENCOUNTER — TELEPHONE (OUTPATIENT)
Dept: ENDOCRINOLOGY CLINIC | Facility: CLINIC | Age: 48
End: 2023-07-10

## 2023-08-09 RX ORDER — DULAGLUTIDE 0.75 MG/.5ML
0.75 INJECTION, SOLUTION SUBCUTANEOUS
Qty: 6 ML | Refills: 0 | OUTPATIENT
Start: 2023-08-09

## 2023-08-28 ENCOUNTER — OFFICE VISIT (OUTPATIENT)
Dept: ENDOCRINOLOGY CLINIC | Facility: CLINIC | Age: 48
End: 2023-08-28

## 2023-08-28 VITALS
HEART RATE: 83 BPM | WEIGHT: 186 LBS | HEIGHT: 67 IN | BODY MASS INDEX: 29.19 KG/M2 | SYSTOLIC BLOOD PRESSURE: 119 MMHG | DIASTOLIC BLOOD PRESSURE: 81 MMHG

## 2023-08-28 DIAGNOSIS — E78.5 HYPERLIPIDEMIA, UNSPECIFIED HYPERLIPIDEMIA TYPE: ICD-10-CM

## 2023-08-28 DIAGNOSIS — Z79.4 TYPE 2 DIABETES MELLITUS WITHOUT COMPLICATION, WITH LONG-TERM CURRENT USE OF INSULIN (HCC): Primary | ICD-10-CM

## 2023-08-28 DIAGNOSIS — E11.9 TYPE 2 DIABETES MELLITUS WITHOUT COMPLICATION, WITH LONG-TERM CURRENT USE OF INSULIN (HCC): Primary | ICD-10-CM

## 2023-08-28 PROCEDURE — 3074F SYST BP LT 130 MM HG: CPT | Performed by: NURSE PRACTITIONER

## 2023-08-28 PROCEDURE — 83036 HEMOGLOBIN GLYCOSYLATED A1C: CPT | Performed by: NURSE PRACTITIONER

## 2023-08-28 PROCEDURE — 3044F HG A1C LEVEL LT 7.0%: CPT | Performed by: NURSE PRACTITIONER

## 2023-08-28 PROCEDURE — 3079F DIAST BP 80-89 MM HG: CPT | Performed by: NURSE PRACTITIONER

## 2023-08-28 PROCEDURE — 99214 OFFICE O/P EST MOD 30 MIN: CPT | Performed by: NURSE PRACTITIONER

## 2023-08-28 PROCEDURE — 3008F BODY MASS INDEX DOCD: CPT | Performed by: NURSE PRACTITIONER

## 2023-08-28 PROCEDURE — 82947 ASSAY GLUCOSE BLOOD QUANT: CPT | Performed by: NURSE PRACTITIONER

## 2023-08-28 RX ORDER — DULAGLUTIDE 3 MG/.5ML
3 INJECTION, SOLUTION SUBCUTANEOUS WEEKLY
Qty: 6 ML | Refills: 0 | Status: SHIPPED | OUTPATIENT
Start: 2023-08-28

## 2023-08-28 RX ORDER — METFORMIN HYDROCHLORIDE 500 MG/1
500 TABLET, EXTENDED RELEASE ORAL 2 TIMES DAILY WITH MEALS
Qty: 180 TABLET | Refills: 0 | Status: SHIPPED | OUTPATIENT
Start: 2023-08-28 | End: 2023-11-26

## 2023-08-28 NOTE — PATIENT INSTRUCTIONS
A1C: 6.5% today; previously was 6.2% on 4/26/2023  Blood glucose: 135 in clinic today  Endocrinology fax# [phone]    Medications:   -decrease Metformin ER 500mg twice daily   -continue with Jardiance 25mg once daily - in morning   -continue with Levemir 46 unit once daily at bedtime   -increase Trulicity 1.5 --> 3 mg once weekly     - repeat lipid panel (cholesterol) in 4 months - fasting 10-12hrs    -before next follow up visit     -increase walking to 30-40 mins daily after dinner meal     Weight:  Wt Readings from Last 6 Encounters:  08/28/23 : 186 lb (84.4 kg)  04/26/23 : 185 lb (83.9 kg)  01/25/23 : 183 lb (83 kg)  10/24/22 : 184 lb (83.5 kg)  08/24/22 : 183 lb (83 kg)  05/23/22 : 185 lb 8 oz (84.1 kg)    A1C goal:  <7.0%    Blood sugar testing:  Test your blood sugar 1-3 times daily   Recommended times to test: Before breakfast (fasting) or 2hrs after meals     Blood sugar targets:  Before breakfast:   (preferably < 110)  Before meals OR 2 hours after meals: <180 (preferably <150)     Call for persistent blood sugars < 75 or > 200

## 2023-08-29 RX ORDER — LANCETS 33 GAUGE
1 EACH MISCELLANEOUS 2 TIMES DAILY
Qty: 200 EACH | Refills: 1 | Status: SHIPPED | OUTPATIENT
Start: 2023-08-29

## 2023-11-24 RX ORDER — METFORMIN HYDROCHLORIDE 500 MG/1
500 TABLET, EXTENDED RELEASE ORAL 2 TIMES DAILY WITH MEALS
Qty: 180 TABLET | Refills: 0 | Status: SHIPPED | OUTPATIENT
Start: 2023-11-24

## 2023-11-24 NOTE — TELEPHONE ENCOUNTER
LOV: 08/28/2023    RTC:4  Months    FU: 12/27/2023    Last Refill: 08/28/2023    3 Month Supply Pending      Please approve if applicable

## 2023-12-26 ENCOUNTER — LAB ENCOUNTER (OUTPATIENT)
Dept: LAB | Age: 48
End: 2023-12-26
Attending: NURSE PRACTITIONER
Payer: MEDICAID

## 2023-12-26 DIAGNOSIS — E11.9 TYPE 2 DIABETES MELLITUS WITHOUT COMPLICATION, WITH LONG-TERM CURRENT USE OF INSULIN (HCC): ICD-10-CM

## 2023-12-26 DIAGNOSIS — Z79.4 TYPE 2 DIABETES MELLITUS WITHOUT COMPLICATION, WITH LONG-TERM CURRENT USE OF INSULIN (HCC): ICD-10-CM

## 2023-12-26 LAB
CHOLEST SERPL-MCNC: 142 MG/DL (ref ?–200)
FASTING PATIENT LIPID ANSWER: NO
HDLC SERPL-MCNC: 31 MG/DL (ref 40–59)
LDLC SERPL CALC-MCNC: 72 MG/DL (ref ?–100)
NONHDLC SERPL-MCNC: 111 MG/DL (ref ?–130)
TRIGL SERPL-MCNC: 237 MG/DL (ref 30–149)
VLDLC SERPL CALC-MCNC: 36 MG/DL (ref 0–30)

## 2023-12-26 PROCEDURE — 80061 LIPID PANEL: CPT

## 2023-12-26 PROCEDURE — 36415 COLL VENOUS BLD VENIPUNCTURE: CPT

## 2023-12-27 ENCOUNTER — OFFICE VISIT (OUTPATIENT)
Dept: ENDOCRINOLOGY CLINIC | Facility: CLINIC | Age: 48
End: 2023-12-27

## 2023-12-27 VITALS
WEIGHT: 185 LBS | HEART RATE: 73 BPM | SYSTOLIC BLOOD PRESSURE: 133 MMHG | BODY MASS INDEX: 29 KG/M2 | DIASTOLIC BLOOD PRESSURE: 84 MMHG

## 2023-12-27 DIAGNOSIS — Z79.4 TYPE 2 DIABETES MELLITUS WITHOUT COMPLICATION, WITH LONG-TERM CURRENT USE OF INSULIN (HCC): Primary | ICD-10-CM

## 2023-12-27 DIAGNOSIS — E11.9 TYPE 2 DIABETES MELLITUS WITHOUT COMPLICATION, WITH LONG-TERM CURRENT USE OF INSULIN (HCC): Primary | ICD-10-CM

## 2023-12-27 LAB
CARTRIDGE LOT#: ABNORMAL NUMERIC
GLUCOSE BLOOD: 103
HEMOGLOBIN A1C: 6.5 % (ref 4.3–5.6)
TEST STRIP LOT #: NORMAL NUMERIC

## 2023-12-27 PROCEDURE — 3079F DIAST BP 80-89 MM HG: CPT | Performed by: NURSE PRACTITIONER

## 2023-12-27 PROCEDURE — 3075F SYST BP GE 130 - 139MM HG: CPT | Performed by: NURSE PRACTITIONER

## 2023-12-27 PROCEDURE — 99213 OFFICE O/P EST LOW 20 MIN: CPT | Performed by: NURSE PRACTITIONER

## 2023-12-27 PROCEDURE — 83036 HEMOGLOBIN GLYCOSYLATED A1C: CPT | Performed by: NURSE PRACTITIONER

## 2023-12-27 PROCEDURE — 82947 ASSAY GLUCOSE BLOOD QUANT: CPT | Performed by: NURSE PRACTITIONER

## 2023-12-27 PROCEDURE — 3044F HG A1C LEVEL LT 7.0%: CPT | Performed by: NURSE PRACTITIONER

## 2023-12-27 NOTE — PATIENT INSTRUCTIONS
A1C: 6.5% today --> previously was 6.5% on 8/28/2023  Blood glucose: 103 in clinic today    Medications:   - continue with Metformin ER 500mg twice daily   -continue with Jardiance 25mg once daily - in morning   -continue with Levemir 46 unit once daily at bedtime   - continue with Trulicity 3 mg once weekly     - continue to follow a low carb diet   - continue to stay active as much as safely able     Weight:  Wt Readings from Last 6 Encounters:   12/27/23 185 lb (83.9 kg)   08/28/23 186 lb (84.4 kg)   04/26/23 185 lb (83.9 kg)   01/25/23 183 lb (83 kg)   10/24/22 184 lb (83.5 kg)   08/24/22 183 lb (83 kg)     A1C goal:  <7.0%    Blood sugar testing:  Test your blood sugar 1 time daily   Recommended times to test: alternate with before breakfast (fasting) or 2hrs after meals      Blood sugar targets:  Before breakfast:   (preferably < 110)  Before meals OR 2 hours after meals: <150    Call for persistent blood sugars < 75 or > 200

## 2024-01-04 RX ORDER — METFORMIN HYDROCHLORIDE 500 MG/1
500 TABLET, EXTENDED RELEASE ORAL 2 TIMES DAILY WITH MEALS
Qty: 180 TABLET | Refills: 0 | Status: SHIPPED | OUTPATIENT
Start: 2024-01-04

## 2024-01-04 RX ORDER — PEN NEEDLE, DIABETIC 31 GX5/16"
1 NEEDLE, DISPOSABLE MISCELLANEOUS NIGHTLY
Qty: 200 EACH | Refills: 2 | Status: SHIPPED | OUTPATIENT
Start: 2024-01-04

## 2024-01-04 RX ORDER — DULAGLUTIDE 3 MG/.5ML
3 INJECTION, SOLUTION SUBCUTANEOUS WEEKLY
Qty: 6 ML | Refills: 0 | Status: SHIPPED | OUTPATIENT
Start: 2024-01-04

## 2024-01-04 RX ORDER — EMPAGLIFLOZIN 25 MG/1
1 TABLET, FILM COATED ORAL DAILY
Qty: 90 TABLET | Refills: 1 | Status: SHIPPED | OUTPATIENT
Start: 2024-01-04

## 2024-01-04 NOTE — TELEPHONE ENCOUNTER
Pt is requesting for refills on Trulicity and needles for Levemir, metformin 500 mg, jardiance 25 mg, levermir running low please follow up

## 2024-01-04 NOTE — TELEPHONE ENCOUNTER
Rx request, please review and sign off if appropriate. Thank you.     Last seen: 12/27/23  Return to clinic: 6  months

## 2024-01-11 ENCOUNTER — TELEPHONE (OUTPATIENT)
Dept: ENDOCRINOLOGY CLINIC | Facility: CLINIC | Age: 49
End: 2024-01-11

## 2024-01-11 NOTE — TELEPHONE ENCOUNTER
Patient states pharmacy does not have Trulicity and Levemir - pt wants to know what to do.  Please call.  Thank you.

## 2024-01-12 RX ORDER — INSULIN GLARGINE 100 [IU]/ML
46 INJECTION, SOLUTION SUBCUTANEOUS NIGHTLY
Qty: 42 ML | Refills: 1 | Status: SHIPPED | OUTPATIENT
Start: 2024-01-12

## 2024-01-12 NOTE — TELEPHONE ENCOUNTER
Called patient and advised him to call surrounding pharmacies to see if they have Trulicity and get back to us if he's unable to find one.     Lore - Levemir is being discontinued.  Please advise if okay to switch to Lantus (another covered brand with Medicaid).  Pt still has 3 pens left of the levemir.

## 2024-01-24 ENCOUNTER — TELEPHONE (OUTPATIENT)
Dept: ENDOCRINOLOGY CLINIC | Facility: CLINIC | Age: 49
End: 2024-01-24

## 2024-01-24 NOTE — TELEPHONE ENCOUNTER
Received a fax of patients most recent eye exam dated on 1/22/24 with Marco Parra MD at [facility]. Eye exam was documented in patient's 'Diabetes Flowsheet' and placed in providers folder for review.

## 2024-01-27 ENCOUNTER — TELEPHONE (OUTPATIENT)
Dept: ENDOCRINOLOGY CLINIC | Facility: CLINIC | Age: 49
End: 2024-01-27

## 2024-01-27 NOTE — TELEPHONE ENCOUNTER
Current Outpatient Medications   Medication Sig Dispense Refill    semaglutide (OZEMPIC, 1 MG/DOSE,) 4 MG/3ML Subcutaneous Solution Pen-injector Inject 1 mg into the skin once a week. 9 mL 0     KEY:[id number]

## 2024-02-02 ENCOUNTER — NURSE ONLY (OUTPATIENT)
Dept: ENDOCRINOLOGY CLINIC | Facility: CLINIC | Age: 49
End: 2024-02-02

## 2024-02-02 DIAGNOSIS — Z79.4 TYPE 2 DIABETES MELLITUS WITHOUT COMPLICATION, WITH LONG-TERM CURRENT USE OF INSULIN (HCC): Primary | ICD-10-CM

## 2024-02-02 DIAGNOSIS — E11.9 TYPE 2 DIABETES MELLITUS WITHOUT COMPLICATION, WITH LONG-TERM CURRENT USE OF INSULIN (HCC): Primary | ICD-10-CM

## 2024-02-02 NOTE — PROGRESS NOTES
Patient and wife in clinic today for Victoza teaching - RN reviewed administration/dosing using demo pen - patient stated understanding and stated he uses lantus pen daily so is familiar with administration  Patient stated understanding to inject 1.8mg victoza daily  Patient denied further questions at this time and will contact clinic if any issues arise

## 2024-02-22 RX ORDER — METFORMIN HYDROCHLORIDE 500 MG/1
500 TABLET, EXTENDED RELEASE ORAL 2 TIMES DAILY WITH MEALS
Qty: 180 TABLET | Refills: 1 | Status: SHIPPED | OUTPATIENT
Start: 2024-02-22

## 2024-02-22 NOTE — TELEPHONE ENCOUNTER
LOV: 12/27/23    RTC:  6 Months    FU: 6/3/24    Last Refill: 1/4/24    6 Month Supply Pending     Called and spoke to patient, appointment booked for June 2024.

## 2024-03-28 ENCOUNTER — TELEPHONE (OUTPATIENT)
Dept: ENDOCRINOLOGY CLINIC | Facility: CLINIC | Age: 49
End: 2024-03-28

## 2024-03-28 DIAGNOSIS — E11.9 TYPE 2 DIABETES MELLITUS WITHOUT COMPLICATION, WITH LONG-TERM CURRENT USE OF INSULIN (HCC): Primary | ICD-10-CM

## 2024-03-28 DIAGNOSIS — Z79.4 TYPE 2 DIABETES MELLITUS WITHOUT COMPLICATION, WITH LONG-TERM CURRENT USE OF INSULIN (HCC): Primary | ICD-10-CM

## 2024-03-28 RX ORDER — PEN NEEDLE, DIABETIC 31 GX5/16"
1 NEEDLE, DISPOSABLE MISCELLANEOUS NIGHTLY
Qty: 200 EACH | Refills: 2 | Status: SHIPPED | OUTPATIENT
Start: 2024-03-28

## 2024-03-28 NOTE — TELEPHONE ENCOUNTER
Current Outpatient Medications   Medication Sig Dispense Refill      180 tablet 1      27 mL 0      42 mL 1    BD PEN NEEDLE SHORT U/F 31G X 8 MM Does not apply Misc 1 each nightly. 200 each 2      90 tablet 1      200 each 1      90 tablet 1      200 strip 1      45 g 1

## 2024-04-16 RX ORDER — INSULIN GLARGINE 100 [IU]/ML
46 INJECTION, SOLUTION SUBCUTANEOUS NIGHTLY
Qty: 42 ML | Refills: 1 | Status: SHIPPED | OUTPATIENT
Start: 2024-04-16

## 2024-04-16 NOTE — TELEPHONE ENCOUNTER
Endocrine refill protocol for basal insulins     Protocol Criteria:  - Appointment with Endocrinology completed in the last 6 months or scheduled in the next 3 months    - A1c result completed in the last 6 months and is <8.5%     Verify appointment has been completed or scheduled in the appropriate timeline. If so can send a 90 day supply with 1 refill per provider protocol.    Verify A1c has been completed within the last 6 months and is below 8.5%     Last completed office visit:12/27/23  Next scheduled Follow up: 06/03/24  Last A1c result:  6.5%

## 2024-05-03 NOTE — TELEPHONE ENCOUNTER
Continuity of Care Form    Patient Name: Karishma Lee   :  1960  MRN:  [id number]    Admit date:  2024  Discharge date:  ***    Code Status Order: Full Code   Advance Directives:     Admitting Physician:  April Garduno MD  PCP: Trina Nixon PA    Discharging Nurse: ***  Discharging Hospital Unit/Room#: 0168/0168-01  Discharging Unit Phone Number: ***    Emergency Contact:   Extended Emergency Contact Information  Primary Emergency Contact: Mayito Lee  Address: [address] [place] [address]            [place] [address] [facility]  Home Phone: [phone]  Mobile Phone: [phone]  Relation: Spouse    Past Surgical History:  Past Surgical History:   Procedure Laterality Date    APPENDECTOMY      CHOLECYSTECTOMY      COLONOSCOPY  2004    Due     CYSTOSCOPY  2010    CYSTOSCOPY  2010    insertion of sparc mesh sling with cystoscopy    HYSTERECTOMY (CERVIX STATUS UNKNOWN)      HYSTERECTOMY, TOTAL ABDOMINAL (CERVIX REMOVED)      JOINT REPLACEMENT      KNEE ARTHROPLASTY      KNEE ARTHROSCOPY Right     KNEE ARTHROSCOPY Left 10/2013    partial medial menisectomy    KNEE SURGERY      PLANTAR FASCIA SURGERY      bilateral    BRANDI AND BSO (CERVIX REMOVED)  2010    DUB    TOTAL KNEE ARTHROPLASTY Right 2019    RIGHT TOTAL KNEE MAKOPLASTY REPLACEMENT WITH ADDUCTOR CANAL BLOCK FOR PAIN CONTROL                 JOE HAKAN  CPT CODE - 77485  performed by Lester Morales MD at [facility] OR    UPPER GASTROINTESTINAL ENDOSCOPY         Immunization History:   Immunization History   Administered Date(s) Administered    COVID-19, MODERNA, (- formula), (age 12y+), IM, 50mcg/0.5mL 10/17/2023    COVID-19, PFIZER Bivalent, DO NOT Dilute, (age 12y+), IM, 30 mcg/0.3 mL 2022    COVID-19, PFIZER GRAY top, DO NOT Dilute, (age 12 y+), IM, 30 mcg/0.3 mL 2022    COVID-19, PFIZER PURPLE top, DILUTE for use, (age 12 y+), 30mcg/0.3mL 2021, 2021, 2021, 2022  rn called patient and inform he can take his medications close together as they dont interfere with each other, patient verbalized understanding of instructions thickness:84516}  Daily Fluid Restriction: {CHP DME Yes amt example:490264800}  Last Modified Barium Swallow with Video (Video Swallowing Test): {Done Not Done Date:}    Treatments at the Time of Hospital Discharge:   Respiratory Treatments: ***  Oxygen Therapy:  {Therapy; copd oxygen:18308}  Ventilator:    {[facility] Vent List:262658400}    Rehab Therapies: {THERAPEUTIC INTERVENTION:0871792962}  Weight Bearing Status/Restrictions: {[facility] Weight Bearin}  Other Medical Equipment (for information only, NOT a DME order):  {EQUIPMENT:718367345}  Other Treatments: ***    Patient's personal belongings (please select all that are sent with patient):  {[facility] DME Belongings:993882996}    RN SIGNATURE:  {Esignature:755220037}    CASE MANAGEMENT/SOCIAL WORK SECTION    Inpatient Status Date: 24    Readmission Risk Assessment Score:  Readmission Risk              Risk of Unplanned Readmission:  9       Outpatient therapy script PT/OT/Speech provided to patient     / signature: Electronically signed by Nirmala Davila RN on 24 at 8:54 AM EDT      PHYSICIAN SECTION    Prognosis: Good    Condition at Discharge: Stable    Rehab Potential (if transferring to Rehab): Good    Recommended Labs or Other Treatments After Discharge:   - Continue PT, OT, and Speech  - Follow up with Neurosurgery, Trauma, and your family doctor  - No driving until cleared by a physician     Physician Certification: I certify the above information and transfer of Karishma Lee  is necessary for the continuing treatment of the diagnosis listed and that she requires Home Care for less 30 days.     Update Admission H&P: No change in H&P    PHYSICIAN SIGNATURE:  Electronically signed by April Garduno MD on 2024 at 8:47 AM

## 2024-06-18 ENCOUNTER — TELEPHONE (OUTPATIENT)
Dept: ENDOCRINOLOGY CLINIC | Facility: CLINIC | Age: 49
End: 2024-06-18

## 2024-06-18 DIAGNOSIS — E11.9 TYPE 2 DIABETES MELLITUS WITHOUT COMPLICATION, WITH LONG-TERM CURRENT USE OF INSULIN (HCC): Primary | ICD-10-CM

## 2024-06-18 DIAGNOSIS — Z79.4 TYPE 2 DIABETES MELLITUS WITHOUT COMPLICATION, WITH LONG-TERM CURRENT USE OF INSULIN (HCC): Primary | ICD-10-CM

## 2024-06-18 NOTE — TELEPHONE ENCOUNTER
Patient requesting refills for Vicotoza.  Please call.  Thank you.    Current Outpatient Medications   Medication Sig Dispense Refill    VICTOZA 18 MG/3ML Subcutaneous Solution Pen-injector Inject 1.8 mg into the skin daily. 27 mL 0

## 2024-06-19 RX ORDER — LIRAGLUTIDE 6 MG/ML
1.8 INJECTION SUBCUTANEOUS DAILY
Qty: 27 ML | Refills: 0 | Status: SHIPPED | OUTPATIENT
Start: 2024-06-19

## 2024-06-19 NOTE — TELEPHONE ENCOUNTER
Endocrine Refill protocol for oral and injectable diabetic medications    Protocol Criteria:pass    -Appointment with Endocrinology completed in the last 6 months or scheduled in the next 3 months    -A1c result <8.5% in the past 6 months      Verify the above has been completed or scheduled in the appropriate timeline. If so can send a 90 day supply with 1 refill.     Last completed office visit: 12/27/2023 Yenny Schumacher APRN   Next scheduled Follow up: 08/12/24     Last A1c result: Last A1c value was 6.5% done 12/27/2023.

## 2024-07-17 ENCOUNTER — TELEPHONE (OUTPATIENT)
Dept: ENDOCRINOLOGY CLINIC | Facility: CLINIC | Age: 49
End: 2024-07-17

## 2024-07-17 NOTE — TELEPHONE ENCOUNTER
Current Outpatient Medications   Medication Sig Dispense Refill    VICTOZA 18 MG/3ML Subcutaneous Solution Pen-injector ADMINISTER 1.8 MG UNDER THE SKIN DAILY 27 mL 0                    Prior Authorization Needed   Pharmacy Message : Plan does not cover this medication,Please call plan at [phone] to initiate prior authorization or call.fax pharmacy to change medication.Patient ID# is [id number]

## 2024-07-18 NOTE — TELEPHONE ENCOUNTER
Called [facility] and was notified that Victoza brand is on formulary while generic is not. Advised to do PA for brand Victoza.

## 2024-07-18 NOTE — TELEPHONE ENCOUNTER
Camryn/JOSE called to clarify if prescription is for brand or generic.  Please call.  Ref # is [id number],

## 2024-07-22 NOTE — TELEPHONE ENCOUNTER
Received fax from [facility] in regards to Victoza 18MG/3ML. Medication has been approved from 04/20/2024 to 07/19/2025. Left message for pt to call us back about approval  and approval letter sent to scanning.     Approval # - [id number]

## 2024-08-12 ENCOUNTER — OFFICE VISIT (OUTPATIENT)
Dept: ENDOCRINOLOGY CLINIC | Facility: CLINIC | Age: 49
End: 2024-08-12

## 2024-08-12 VITALS
WEIGHT: 182.5 LBS | SYSTOLIC BLOOD PRESSURE: 129 MMHG | HEART RATE: 73 BPM | DIASTOLIC BLOOD PRESSURE: 87 MMHG | HEIGHT: 67 IN | BODY MASS INDEX: 28.64 KG/M2

## 2024-08-12 DIAGNOSIS — E11.65 TYPE 2 DIABETES MELLITUS WITH HYPERGLYCEMIA, WITH LONG-TERM CURRENT USE OF INSULIN (HCC): Primary | ICD-10-CM

## 2024-08-12 DIAGNOSIS — Z79.4 TYPE 2 DIABETES MELLITUS WITH HYPERGLYCEMIA, WITH LONG-TERM CURRENT USE OF INSULIN (HCC): Primary | ICD-10-CM

## 2024-08-12 LAB
GLUCOSE BLOOD: 118
HEMOGLOBIN A1C: 7.2 % (ref 4.3–5.6)
TEST STRIP LOT #: NORMAL NUMERIC

## 2024-08-12 PROCEDURE — 82947 ASSAY GLUCOSE BLOOD QUANT: CPT | Performed by: NURSE PRACTITIONER

## 2024-08-12 PROCEDURE — 99214 OFFICE O/P EST MOD 30 MIN: CPT | Performed by: NURSE PRACTITIONER

## 2024-08-12 PROCEDURE — 83036 HEMOGLOBIN GLYCOSYLATED A1C: CPT | Performed by: NURSE PRACTITIONER

## 2024-08-12 RX ORDER — PEN NEEDLE, DIABETIC 31 GX5/16"
1 NEEDLE, DISPOSABLE MISCELLANEOUS DAILY
Qty: 100 EACH | Refills: 1 | Status: SHIPPED | OUTPATIENT
Start: 2024-08-12

## 2024-08-12 RX ORDER — DULAGLUTIDE 3 MG/.5ML
3 INJECTION, SOLUTION SUBCUTANEOUS WEEKLY
Qty: 6 ML | Refills: 1 | Status: SHIPPED | OUTPATIENT
Start: 2024-08-12

## 2024-08-12 RX ORDER — INSULIN GLARGINE 100 [IU]/ML
46 INJECTION, SOLUTION SUBCUTANEOUS NIGHTLY
Qty: 42 ML | Refills: 1 | Status: SHIPPED | OUTPATIENT
Start: 2024-08-12

## 2024-08-12 RX ORDER — EMPAGLIFLOZIN 25 MG/1
1 TABLET, FILM COATED ORAL DAILY
Qty: 90 TABLET | Refills: 1 | Status: SHIPPED | OUTPATIENT
Start: 2024-08-12

## 2024-08-12 RX ORDER — METFORMIN HYDROCHLORIDE 500 MG/1
500 TABLET, EXTENDED RELEASE ORAL 2 TIMES DAILY WITH MEALS
Qty: 180 TABLET | Refills: 1 | Status: SHIPPED | OUTPATIENT
Start: 2024-08-12

## 2024-08-12 RX ORDER — LISINOPRIL 20 MG/1
20 TABLET ORAL DAILY
Qty: 90 TABLET | Refills: 1 | Status: SHIPPED | OUTPATIENT
Start: 2024-08-12

## 2024-08-12 NOTE — PATIENT INSTRUCTIONS
A1C: 7.2% today --> increased from 6.5% on 12/27/2023  Blood glucose: 118 in clinic today    Medications:   - continue with Metformin ER 500mg twice daily   - continue with Jardiance 25mg once daily   - continue with Lantus 46 unit once daily   - continue with victoza 1.8mg once daily in the morning until home supply is finished    ---> then transition to Trulicity 3mg once weekly         Weight:  Wt Readings from Last 6 Encounters:   08/12/24 182 lb 8 oz (82.8 kg)   12/27/23 185 lb (83.9 kg)   08/28/23 186 lb (84.4 kg)   04/26/23 185 lb (83.9 kg)   01/25/23 183 lb (83 kg)   10/24/22 184 lb (83.5 kg)     A1C goal:  <7.0%    Blood sugar testing:  Test your blood sugar 1 time daily   Recommended times to test: before meals or 2hrs after completing meal     Blood sugar targets:  Before breakfast:  (preferably < 110)  2 hours after meals: <150      Call for persistent blood sugars < 75 or > 200

## 2024-08-12 NOTE — PROGRESS NOTES
Name: Ji Miller  Date: 8/12/2024    CHIEF COMPLAINT   Chief Complaint   Patient presents with    Diabetes     Pt is here for follow up for diabetes and A1c check      HISTORY OF PRESENT ILLNESS   Ji Miller is a 49 year old male who presents for follow up on diabetes management.    Hgb A1C: 7.2% at POC today. Previously was 6.5% pm 12/27/2023.   Blood glucose: 118 in clinic today.     FAMILY HISTORY OF DIABETES  -mother - T2DM  -older sister- pre-diabetes  DIABETES HISTORY  Diagnosed: around 3-4 yrs ago   Prior HbA, C or glycohemoglobin were 9.0% on 9/30/2021; 8.0% 11/22/2021; 7.2% 2/23/2022; 7.4% 5/23/2022; 8.2% 8/24/2022; 6.7% 10/24/2022; 6.5% 1/25/2023; 6.2% 4/26/3034; 6.5% 8/28/2023; 6.5% 12/27/2023; 7.2% at POC today;     Patient has not had hospitalizations for blood sugar issues.   Denies any history of pancreatitis.     PREVIOUS MEDICATION FOR DM:  xigduo -d/c'ed due to high cost  Trulicity- d/c'ed due to supply shortage     CURRENT MEDICATIONS FOR DM:  -Metformin  twice daily - tolerating well; denies GI s/e   -Jardiance 25mg once daily in AM  -Lantus 46 unit once daily   -Victoza 1.8mg once daily in the morning - was not able to take for 1 month due to supply shortage; recently was able to resume therapy    HOME GLUCOSE READINGS:   work at 2:30am to 11:30-12pm   Sleeps from 4pm to 6-7pm   Eating around 7-8pm    Sleeping 8pm to 12am     -- 120s    -- 150s-160s while no taking victoza medication   Eating around 1am   Eating again at 12pm     Hypoglycemia present: no    HISTORY OF DIABETES COMPLICATIONS:  History of Retinopathy: no - last eye exam within the last 12 months: yes  - last exam was 1/2024 with Dr. Parra   History of Neuropathy: no   History of Nephropathy: no    ASSOCIATED COMPLICATIONS:   HTN: yes   Hyperlipidemia: no   Cardiovascular Disease: no  Peripheral Vascular Disease: no     DIETARY COMPLIANCE:  Good; has been following low carb diet   Continuing to eat 3  meals per day     EXERCISE:   No -- however stays active with work - working overnight now    Polyuria, polyphagia, polydipsia: no   Paresthesias: no  Blurred vision: no   Recent steroids, illness or infections: no     REVIEW OF SYSTEMS  Constitutional: Negative for: weight change, fever, fatigue, cold/heat intolerance  Eyes: Negative for:  Visual changes, proptosis, blurring  ENT: Negative for:  dysphagia, neck swelling, dysphonia  Respiratory: Negative for: hemoptysis, shortness of breath, cough, or dyspnea.  Cardiovascular: Negative for:  chest pain, chest discomfort, palpitations  GI: Negative for:  abdominal pain, nausea, vomiting, diarrhea, heartburn, constipation  Neurology: Negative for: headache, dizziness, syncope, numbness/tingling, or weakness.   Genito-Urinary: Negative for: dysuria, frequency or hematuria   Hematology/Lymphatics: Negative for: bruising, easy bleeding, lower extremity edema  Skin: Negative for: rash, blister, infection or ulcers.  Endocrine: Negative for: polyuria, polydipsia. No osteoporosis. No thyroid disease.     MEDICATIONS:     Current Outpatient Medications:     VICTOZA 18 MG/3ML Subcutaneous Solution Pen-injector, ADMINISTER 1.8 MG UNDER THE SKIN DAILY, Disp: 27 mL, Rfl: 0    insulin glargine (LANTUS SOLOSTAR) 100 UNIT/ML Subcutaneous Solution Pen-injector, Inject 46 Units into the skin nightly., Disp: 42 mL, Rfl: 1    BD PEN NEEDLE SHORT U/F 31G X 8 MM Does not apply Misc, 1 each nightly., Disp: 200 each, Rfl: 2    metFORMIN  MG Oral Tablet 24 Hr, Take 1 tablet (500 mg total) by mouth 2 (two) times daily with meals., Disp: 180 tablet, Rfl: 1    Empagliflozin (JARDIANCE) 25 MG Oral Tab, Take 1 tablet by mouth daily., Disp: 90 tablet, Rfl: 1    Lancets (ONETOUCH DELICA PLUS [id number]) Does not apply Misc, 1 strip by In Vitro route 2 (two) times daily., Disp: 200 each, Rfl: 1    lisinopril 20 MG Oral Tab, Take 1 tablet (20 mg total) by mouth daily., Disp: 90 tablet, Rfl: 1     Glucose Blood (ONETOUCH VERIO) In Vitro Strip, Test 2x daily, Disp: 200 strip, Rfl: 1    clotrimazole (LOTRIMIN AF) 1 % External Cream, Apply 1 Application topically 2 (two) times daily., Disp: 45 g, Rfl: 1     ALLERGIES:   No Known Allergies    SOCIAL HISTORY:   Social History     Socioeconomic History    Marital status:    Tobacco Use    Smoking status: Former     Current packs/day: 0.00     Types: Cigarettes     Quit date: 10/1/1991     Years since quittin.8    Smokeless tobacco: Never   Vaping Use    Vaping status: Never Used   Substance and Sexual Activity    Alcohol use: Not Currently    Drug use: Never      Social History     Socioeconomic History    Marital status:    Tobacco Use    Smoking status: Former     Current packs/day: 0.00     Types: Cigarettes     Quit date: 10/1/1991     Years since quittin.8    Smokeless tobacco: Never   Vaping Use    Vaping status: Never Used   Substance and Sexual Activity    Alcohol use: Not Currently    Drug use: Never     PAST MEDICAL HISTORY:   Past Medical History:    Diabetes (HCC)     PAST SURGICAL HISTORY:   Past Surgical History:   Procedure Laterality Date    Hernia surgery      around        PHYSICAL EXAM:   Vitals:    24 1445 24 1547   BP: 130/86 129/87   BP Location:  Left arm   Patient Position:  Sitting   Cuff Size:  adult   Pulse: 76 73   Weight: 182 lb 8 oz (82.8 kg)    Height: 5' 7\" (1.702 m)      BMI:   Body mass index is 28.58 kg/m².      General Appearance:  alert, well developed, in no acute distress  Nutritional:  no extreme weight gain or loss  Head: Atraumatic  Eyes:  normal conjunctivae, sclera., normal sclera and normal pupils  Throat/Neck: normal sound to voice. Normal hearing, normal speech  Back: no kyphosis  Respiratory:  Speaking in full sentences, non-labored. no increased work of breathing, no audible wheezing    Skin:  normal moisture and skin texture, no visible lesions  Hair and nails: normal scalp  hair;   Hematologic:  no excessive bruising  Neuro: motor grossly intact, moving all extremities without difficulty  Psychiatric:  oriented to time, self, and place  Extremities: no obvious extremity swelling, no lesions      Diabetes Foot Exam:  Bilateral barefoot skin diabetic exam is normal, visualized feet and the appearance is normal.  Bilateral monofilament/sensation of both feet is normal.  Pulsation pedal pulse exam of both lower legs/feet is normal as well.    LABS: Pertinent labs reviewed    ASSESSMENT/PLAN:    -Reviewed with patient the pathogenesis of diabetes, clinical significance of A1c, and common complications such as: microvascular, macrovascular and diabetic ketoacidosis. Patient verbalizes understanding of the importance of glycemic control and the goals of therapy.   -Discussed with patient glucose targets ranges (Fasting  and post prandial <180).     1.Type 2 Diabetes Mellitus, uncontrolled   -LAB DATA  HbA,C: 7.2% today   a) Medications  - continue with Metformin ER 500mg twice daily   -continue with Jardiance 25mg once daily   -continue with Lantus 46 unit once daily at bedtime   - continue with victoza 1.8mg once daily in the morning until home supply is finished    ---> then transition to Trulicity 3mg once weekly     -discussed to continue to follow a low carb diet.   -discussed to continue to stay active as much as safely able - goal at least 150 mins per week    -Discussed importance of hydration. Patient advised to drink 1-2 glasses additional of water than usually to avoid dehydration while on Jardiance.   -reviewed target goal BG readings and A1C    -reviewed when to call and notify me of abnormal BG readings.       b) No nephropathy: GFR: 107 and urine MA: <3 on 5/28/2024  c) UTD with optho--last exam was 1/2024 with Dr. Parra   D)foot exam: normal today 8/12/2024  e) cont. testing BG reading 1-3x daily- alternate with fasting or before dinner  Or bedtime.   f) Life style  changes reviewed.     2. Hypertension   -on Lisinopril to 20mg once daily in AM  -normotensive today     3. Hyperlipidemia   -LDL: 90 and Tri on 2024  - on lovastatin 10mg at bedtime   - reviewed ADA guidelines of LDL <70      RTC in 4 months   Patient instructed to call sooner if they develop Blood glucose readings <75 and/or if they have readings persistently >200.     The risks and benefits of my recommendations were discussed with the patient today. questions were also answered to the best of my knowledge. Patient verbalizes understanding of these issues and agrees to the plan.    2024  MILES Urbina

## 2024-08-13 ENCOUNTER — TELEPHONE (OUTPATIENT)
Dept: ENDOCRINOLOGY CLINIC | Facility: CLINIC | Age: 49
End: 2024-08-13

## 2024-08-15 NOTE — TELEPHONE ENCOUNTER
Spoke with [facility].   States that prescription for Lantus sent on 8/12 is ready for pickup by pt for $0 copay  Closing encounter

## 2024-08-19 ENCOUNTER — TELEPHONE (OUTPATIENT)
Dept: ENDOCRINOLOGY CLINIC | Facility: CLINIC | Age: 49
End: 2024-08-19

## 2024-08-19 NOTE — TELEPHONE ENCOUNTER
Patient calling back, states was called by pharmacy that they will be getting Trulicity tomorrow. Please call. ([facility] pharmacy on file)

## 2024-08-19 NOTE — TELEPHONE ENCOUNTER
Spoke to pt. Pt confirmed Trulicity will be at his pharmacy tomorrow and will pick it up then. Nothing is needed from our office at this time. No other questions or concerns.    [Medical Office: ([facility])___] : at the medical office located in  [Patient] : the patient [Home] : at home, [unfilled] , at the time of the visit. [FreeTextEntry6] : Patient has cut/ ulceration on right side of inner gum like by molars and white spot oin left tonsil. She denies fever /cold sx or trauma to area.\par she has been gargling with salt water/ peroxide. [FreeTextEntry2] : Fanta Pate [Self] : self

## 2024-08-22 ENCOUNTER — TELEPHONE (OUTPATIENT)
Dept: ENDOCRINOLOGY CLINIC | Facility: CLINIC | Age: 49
End: 2024-08-22

## 2024-08-22 NOTE — TELEPHONE ENCOUNTER
Lore mason, patient wanted to let you know she was prescribed Cephalexin 500mg due to a cut. Patient wanted to make sure it was ok for him to take and make sure it does not interact with any of his medications. Please advise. Thank you.

## 2024-08-22 NOTE — TELEPHONE ENCOUNTER
Patient calling regards going to urgent care yesterday regards a cut and was prescribed Cephalexin 500MG. Unsure if interacts with any medication he is currently taking. Please call.

## 2024-08-22 NOTE — TELEPHONE ENCOUNTER
Called patient and communicated message below. Patient concerned about acetaminophen. RN advised is ok to take and to make sure he only takes it as ordered.

## 2024-10-11 ENCOUNTER — TELEPHONE (OUTPATIENT)
Dept: ENDOCRINOLOGY CLINIC | Facility: CLINIC | Age: 49
End: 2024-10-11

## 2024-10-11 NOTE — TELEPHONE ENCOUNTER
Current Outpatient Medications   Medication Sig Dispense Refill    Dulaglutide (TRULICITY) 3 MG/0.5ML Subcutaneous Solution Pen-injector Inject 3 mg into the skin once a week. 6 mL 1     Key # [id number]

## 2024-10-12 NOTE — TELEPHONE ENCOUNTER
Called pharmacy to confirm if medication truly requires PA.  They are also running it for 30 days at a time as opposed to 90 and still getting rejected.     Medication PA Requested: Trulicity 3 mg                                                          CoverMyMeds Used:    Key: [id number]   Quantity: 6 ml  Day Supply: 90 days  Sig: Inject 3 mg under the skin every 7 days  DX Code: E11.65                                    CPT code (if applicable):   Case Number/Pending Ref#:    Obtained below information from pharmacy  Insurance: [facility] ?  Grp: [id number]  BN [id number]  PCN: [id number]  ID: [id number]  Ph: [phone]

## 2024-10-16 NOTE — TELEPHONE ENCOUNTER
Patient came to office stated that [employer] told him he needed to come to the office to update his Insurance. Insurance has been updated, he does not have no more medication is requesting if Prior Auth can be expedited.

## 2024-10-23 ENCOUNTER — MED REC SCAN ONLY (OUTPATIENT)
Dept: ENDOCRINOLOGY CLINIC | Facility: CLINIC | Age: 49
End: 2024-10-23

## 2024-10-23 NOTE — TELEPHONE ENCOUNTER
MILES Torrez,  Spoke to Jolene at [employer] (phone [phone]) - will fax clinical questions to complete for trulicity 3mg     [employer] form completed and placed in provider's folder for signature and faxing with OV note and A1c dtd 8/12/24    Spoke to patient to update  Thanks

## 2024-10-24 ENCOUNTER — TELEPHONE (OUTPATIENT)
Dept: ENDOCRINOLOGY CLINIC | Facility: CLINIC | Age: 49
End: 2024-10-24

## 2024-10-24 NOTE — TELEPHONE ENCOUNTER
Spoke with pharmacy and Lantus is not covered with patient's new insurance. Unable to give me alternatives.     Left detailed message for patient to ask insurance to provide long acting insulin on formulary.

## 2024-10-24 NOTE — TELEPHONE ENCOUNTER
Dulaglutide (TRULICITY) 3 MG/0.5ML Subcutaneous Solution Pen-injector, Inject 3 mg into the skin once a week., Disp: 6 mL, Rfl: 1  PA required

## 2024-10-24 NOTE — TELEPHONE ENCOUNTER
insulin glargine (LANTUS SOLOSTAR) 100 UNIT/ML Subcutaneous Solution Pen-injector, Inject 46 Units into the skin nightly., Disp: 42 mL, Rfl: 1  Key:[id number]

## 2024-10-24 NOTE — TELEPHONE ENCOUNTER
Received fax from [employer] dated on 10/24/24 stating the TRULICITY SUBCUTANEOUS SOL AUTO INJ 3MG/0.5ML has been approved for the prescription and any applicable refills. The approval shall be valid indefinitely or through the end of the current term of our agreemant with pt plan sponsor.

## 2024-11-13 NOTE — TELEPHONE ENCOUNTER
Called insurance and generic glargine went through. Jardiance was denied due to not t/f metformin. Notified rep that pt is currently taking MTF. Per rep, can submit PA and indicate that pt is currently taking MTF.     [phone]  Called to submit PA, per rep, will fax a request, once physically completed can fax back to [employer]. Per rep, PA cannot be submitted via Surescripts or Covermymeds, only via fax.     Will await fax from [employer] PA.

## 2024-11-13 NOTE — TELEPHONE ENCOUNTER
[employer] called to speak with a nurse in regards to the patient's medication. Please call [phone].

## 2024-11-20 NOTE — TELEPHONE ENCOUNTER
Received fax from [employer] dated on 11/20/24 requesting step therapy clinical review form. Placed in providers folder for review.

## 2024-11-21 NOTE — TELEPHONE ENCOUNTER
Endocrine Refill protocol for oral and injectable diabetic medications    Protocol Criteria:  PASSED  Reason: N/A    If all below requirements are met, send a 90-day supply with 1 refill per provider protocol.    Verify appointment with Endocrinology completed in the last 6 months or scheduled in the next 3 months.  Verify A1C has been completed within the last 6 months and is below 8.5%     Last completed office visit: 8/12/2024 Yenny Schumacher APRN   Next scheduled Follow up:   Future Appointments   Date Time Provider Department Center   12/11/2024  2:30 PM Yenny Schumacher APRN [facility]      Last A1c result: Last A1c value was 7.2% done 8/12/2024.

## 2024-11-21 NOTE — TELEPHONE ENCOUNTER
Current Outpatient Medications    Empagliflozin (JARDIANCE) 25 MG Oral Tab Take 1 tablet by mouth daily. 90 tablet 1

## 2024-11-22 NOTE — TELEPHONE ENCOUNTER
Received fax requesting additional information about Jardiance PA. Form completed and faxed to [employer] at [phone].

## 2024-11-26 ENCOUNTER — TELEPHONE (OUTPATIENT)
Dept: ENDOCRINOLOGY CLINIC | Facility: CLINIC | Age: 49
End: 2024-11-26

## 2024-11-26 RX ORDER — PEN NEEDLE, DIABETIC 31 GX5/16"
1 NEEDLE, DISPOSABLE MISCELLANEOUS DAILY
Qty: 100 EACH | Refills: 1 | Status: SHIPPED | OUTPATIENT
Start: 2024-11-26

## 2024-11-26 NOTE — TELEPHONE ENCOUNTER
Medication Quantity Refills Start End   Insulin Pen Needle (BD PEN NEEDLE SHORT U/F) 31G X 8 MM Does not apply Misc 100 each 1 2024 --   Si each daily.

## 2024-11-26 NOTE — TELEPHONE ENCOUNTER
Endocrine Refill protocol for Glucose testing supplies     Protocol Criteria: PASSED Reason: N/A    If below requirement is met, send a 90-day supply with 1 refill per provider protocol.    Verify appointment with Endocrinology completed in the last 6 months or scheduled in the next 3 months.    Last completed office visit: 8/12/2024 Yenny Scuhmacher APRN   Next scheduled Follow up:   Future Appointments   Date Time Provider Department Center   12/11/2024  2:30 PM Yenny Schumacher APRN [facility]

## 2024-12-11 ENCOUNTER — OFFICE VISIT (OUTPATIENT)
Dept: ENDOCRINOLOGY CLINIC | Facility: CLINIC | Age: 49
End: 2024-12-11

## 2024-12-11 VITALS
DIASTOLIC BLOOD PRESSURE: 78 MMHG | HEART RATE: 82 BPM | WEIGHT: 180.81 LBS | BODY MASS INDEX: 28.38 KG/M2 | HEIGHT: 67 IN | SYSTOLIC BLOOD PRESSURE: 121 MMHG

## 2024-12-11 DIAGNOSIS — M72.2 PLANTAR FASCIITIS OF LEFT FOOT: Primary | ICD-10-CM

## 2024-12-11 DIAGNOSIS — Z79.4 TYPE 2 DIABETES MELLITUS WITHOUT COMPLICATION, WITH LONG-TERM CURRENT USE OF INSULIN (HCC): ICD-10-CM

## 2024-12-11 DIAGNOSIS — E11.9 TYPE 2 DIABETES MELLITUS WITHOUT COMPLICATION, WITH LONG-TERM CURRENT USE OF INSULIN (HCC): ICD-10-CM

## 2024-12-11 LAB
GLUCOSE BLOOD: 125
HEMOGLOBIN A1C: 6.3 % (ref 4.3–5.6)
TEST STRIP LOT #: NORMAL NUMERIC

## 2024-12-11 PROCEDURE — 82947 ASSAY GLUCOSE BLOOD QUANT: CPT | Performed by: NURSE PRACTITIONER

## 2024-12-11 PROCEDURE — 99213 OFFICE O/P EST LOW 20 MIN: CPT | Performed by: NURSE PRACTITIONER

## 2024-12-11 PROCEDURE — 83036 HEMOGLOBIN GLYCOSYLATED A1C: CPT | Performed by: NURSE PRACTITIONER

## 2024-12-11 PROCEDURE — 3051F HG A1C>EQUAL 7.0%<8.0%: CPT | Performed by: NURSE PRACTITIONER

## 2024-12-11 PROCEDURE — 3078F DIAST BP <80 MM HG: CPT | Performed by: NURSE PRACTITIONER

## 2024-12-11 PROCEDURE — 3074F SYST BP LT 130 MM HG: CPT | Performed by: NURSE PRACTITIONER

## 2024-12-11 PROCEDURE — 3044F HG A1C LEVEL LT 7.0%: CPT | Performed by: NURSE PRACTITIONER

## 2024-12-11 PROCEDURE — 3008F BODY MASS INDEX DOCD: CPT | Performed by: NURSE PRACTITIONER

## 2024-12-11 RX ORDER — INSULIN GLARGINE-YFGN 100 [IU]/ML
46 INJECTION, SOLUTION SUBCUTANEOUS DAILY
Qty: 41 ML | Refills: 0 | Status: SHIPPED | OUTPATIENT
Start: 2024-12-11 | End: 2025-03-11

## 2024-12-11 NOTE — PATIENT INSTRUCTIONS
A1C: 6.3% today --> previously was 7.4% on 11/2024  Blood glucose: 125 in clinic today    Medications:   - continue with Metformin ER 500mg twice daily   -continue with Jardiance 25mg once daily   -continue with Lantus 46 unit once daily at bedtime   - continue with Trulicity 3mg once weekly     - continue with following a low carb diet   - continue to stay active as currently doing     Weight:  Wt Readings from Last 6 Encounters:   12/11/24 180 lb 12.8 oz (82 kg)   08/12/24 182 lb 8 oz (82.8 kg)   12/27/23 185 lb (83.9 kg)   08/28/23 186 lb (84.4 kg)   04/26/23 185 lb (83.9 kg)   01/25/23 183 lb (83 kg)     A1C goal:  <7.0%    Blood sugar testing:  Test your blood sugar 1 time daily   Recommended times to test: alternate with before breakfast (fasting) or 2hrs after meals     Blood sugar targets:  Before breakfast:  (preferably < 110)  Before meals: <150   2 hours after meals: <150     Call for persistent blood sugars < 75 or > 200

## 2024-12-11 NOTE — PROGRESS NOTES
Name: Ji Miller  Date: 12/11/2024    CHIEF COMPLAINT   Chief Complaint   Patient presents with    Diabetes     Pt is here for follow up for diabetes and A1c check     HISTORY OF PRESENT ILLNESS   Ji Miller is a 49 year old male who presents for follow up on diabetes management.    Hgb A1C: 6.3% at POC today. Previously was 7.4% on 11/2024.   Blood glucose: 125 in clinic today.   Patient notes that he has been feeling fair. In late August had a work related accident to left calf that caused him the need to have 8 stitches. He took abx with this. He continues to have left calf pain that radiates to left ankle. Has met with podiatrist and has been recommended to have surgery for plantar fascitis. He would like to obtain second opinion on this.   He continues to follow a low carb diet and has been compliant with taking all diabetes medications.      FAMILY HISTORY OF DIABETES  -mother - T2DM  -older sister- pre-diabetes  DIABETES HISTORY  Diagnosed: around 3-4 yrs ago   Prior HbA, C or glycohemoglobin were 9.0% on 9/30/2021; 8.0% 11/22/2021; 7.2% 2/23/2022; 7.4% 5/23/2022; 8.2% 8/24/2022; 6.7% 10/24/2022; 6.5% 1/25/2023; 6.2% 4/26/3034; 6.5% 8/28/2023; 6.5% 12/27/2023; 7.2% 8/2024; 7.4% 11/2024; 6.3% at POC today;     Patient has not had hospitalizations for blood sugar issues.   Denies any history of pancreatitis.     PREVIOUS MEDICATION FOR DM:  xigduo -d/c'ed due to high cost  Trulicity- d/c'ed due to supply shortage   - Victoza-d/c'ed due lack of glycemic control     CURRENT MEDICATIONS FOR DM:  -Metformin  twice daily - tolerating well; denies GI s/e   - Jardiance 25mg once daily in AM  - Lantus 46 unit once daily   - Trulicity 3mg once weekly - tolerating well; denies any GI s/e     HOME GLUCOSE READINGS:   Testing 1x daily   Fasting- around 8pm: 80s -90s-110s   Does not check any other time for the day  Hypoglycemia present: no    HISTORY OF DIABETES COMPLICATIONS:  History of  Retinopathy: no - last eye exam within the last 12 months: yes  - last exam was 1/2024 with Dr. Parra   History of Neuropathy: no   History of Nephropathy: no    ASSOCIATED COMPLICATIONS:   HTN: yes   Hyperlipidemia: no   Cardiovascular Disease: no  Peripheral Vascular Disease: no     DIETARY COMPLIANCE:  Good; has been following low carb diet   Continuing to eat 3 meals per day     first meal 8-9pm   Second meal around 10am   Third meal around 3pm     EXERCISE:   No -- however stays active with work - working overnight now    Polyuria, polyphagia, polydipsia: no   Paresthesias: no  Blurred vision: no   Recent steroids, illness or infections: no     REVIEW OF SYSTEMS  Constitutional: Negative for: weight change, fever, fatigue, cold/heat intolerance  Eyes: Negative for:  Visual changes, proptosis, blurring  ENT: Negative for:  dysphagia, neck swelling, dysphonia  Respiratory: Negative for: hemoptysis, shortness of breath, cough, or dyspnea.  Cardiovascular: Negative for:  chest pain, chest discomfort, palpitations  GI: Negative for:  abdominal pain, nausea, vomiting, diarrhea, heartburn, constipation  Neurology: Negative for: headache, dizziness, syncope, numbness/tingling, or weakness.   Genito-Urinary: Negative for: dysuria, frequency or hematuria   Hematology/Lymphatics: Negative for: bruising, easy bleeding, lower extremity edema  Skin: Negative for: rash, blister, infection or ulcers.  Endocrine: Negative for: polyuria, polydipsia. No osteoporosis. No thyroid disease.     MEDICATIONS:     Current Outpatient Medications:     Insulin Pen Needle (BD PEN NEEDLE SHORT U/F) 31G X 8 MM Does not apply Misc, 1 each daily., Disp: 100 each, Rfl: 1    empagliflozin (JARDIANCE) 25 MG Oral Tab, Take 1 tablet (25 mg total) by mouth daily., Disp: 90 tablet, Rfl: 1    Dulaglutide (TRULICITY) 3 MG/0.5ML Subcutaneous Solution Pen-injector, Inject 3 mg into the skin once a week., Disp: 6 mL, Rfl: 1    insulin glargine (LANTUS  SOLOSTAR) 100 UNIT/ML Subcutaneous Solution Pen-injector, Inject 46 Units into the skin nightly., Disp: 42 mL, Rfl: 1    metFORMIN  MG Oral Tablet 24 Hr, Take 1 tablet (500 mg total) by mouth 2 (two) times daily with meals., Disp: 180 tablet, Rfl: 1    lisinopril 20 MG Oral Tab, Take 1 tablet (20 mg total) by mouth daily., Disp: 90 tablet, Rfl: 1    Lancets (ONETOUCH DELICA PLUS CAEJPX12H) Does not apply Misc, 1 strip by In Vitro route 2 (two) times daily., Disp: 200 each, Rfl: 1    Glucose Blood (ONETOUCH VERIO) In Vitro Strip, Test 2x daily, Disp: 200 strip, Rfl: 1    clotrimazole (LOTRIMIN AF) 1 % External Cream, Apply 1 Application topically 2 (two) times daily., Disp: 45 g, Rfl: 1     ALLERGIES:   No Known Allergies    SOCIAL HISTORY:   Social History     Socioeconomic History    Marital status:    Tobacco Use    Smoking status: Former     Current packs/day: 0.00     Types: Cigarettes     Quit date: 10/1/1991     Years since quittin.2    Smokeless tobacco: Never   Vaping Use    Vaping status: Never Used   Substance and Sexual Activity    Alcohol use: Not Currently    Drug use: Never      Social History     Socioeconomic History    Marital status:    Tobacco Use    Smoking status: Former     Current packs/day: 0.00     Types: Cigarettes     Quit date: 10/1/1991     Years since quittin.2    Smokeless tobacco: Never   Vaping Use    Vaping status: Never Used   Substance and Sexual Activity    Alcohol use: Not Currently    Drug use: Never     PAST MEDICAL HISTORY:   Past Medical History:    Diabetes (HCC)     PAST SURGICAL HISTORY:   Past Surgical History:   Procedure Laterality Date    Hernia surgery      around        PHYSICAL EXAM:   Vitals:    24 1437   BP: 121/78   Pulse: 82   Weight: 180 lb 12.8 oz (82 kg)   Height: 5' 7\" (1.702 m)     BMI:   Body mass index is 28.32 kg/m².      General Appearance:  alert, well developed, in no acute distress  Nutritional:  no extreme  weight gain or loss  Head: Atraumatic  Eyes:  normal conjunctivae, sclera., normal sclera and normal pupils  Throat/Neck: normal sound to voice. Normal hearing, normal speech  Back: no kyphosis  Respiratory:  Speaking in full sentences, non-labored. no increased work of breathing, no audible wheezing    Skin:  normal moisture and skin texture, no visible lesions  Hair and nails: normal scalp hair;   Hematologic:  no excessive bruising  Neuro: motor grossly intact, moving all extremities without difficulty  Psychiatric:  oriented to time, self, and place  Extremities: no obvious extremity swelling, no lesions      LABS: Pertinent labs reviewed    ASSESSMENT/PLAN:    -Reviewed with patient the pathogenesis of diabetes, clinical significance of A1c, and common complications such as: microvascular, macrovascular and diabetic ketoacidosis. Patient verbalizes understanding of the importance of glycemic control and the goals of therapy.   -Discussed with patient glucose targets ranges (Fasting  and post prandial <180).     1.Type 2 Diabetes Mellitus, controlled   -LAB DATA  HbA,C: 6.3% today   a) Medications  - continue with Metformin ER 500mg twice daily   -continue with Jardiance 25mg once daily   -continue with Lantus 46 unit once daily at bedtime   - continue with Trulicity 3mg once weekly     -discussed to continue to follow a low carb diet.   -discussed to continue to stay active as much as safely able - goal at least 150 mins per week    -Discussed importance of hydration. Patient advised to drink 1-2 glasses additional of water than usually to avoid dehydration while on Jardiance.   -reviewed target goal BG readings and A1C    -reviewed when to call and notify me of abnormal BG readings.       b) No nephropathy: GFR: 107 and urine MA: <3 on 5/28/2024  c) UTD with optho--last exam was 1/2024 with Dr. Parra   D)foot exam: normal on 8/12/2024  e) cont. testing BG reading 1-3x daily- alternate with fasting or  before dinner  Or bedtime.   f) Life style changes reviewed.     2. Hypertension   -on Lisinopril to 20mg once daily in AM  -normotensive today     3. Hyperlipidemia   -LDL: 90 and Tri on 2024  - on lovastatin 10mg at bedtime   - reviewed ADA guidelines of LDL <70      RTC in 4 months   Patient instructed to call sooner if they develop Blood glucose readings <75 and/or if they have readings persistently >200.     The risks and benefits of my recommendations were discussed with the patient today. questions were also answered to the best of my knowledge. Patient verbalizes understanding of these issues and agrees to the plan.    2024  MILES Urbina

## 2025-01-23 ENCOUNTER — TELEPHONE (OUTPATIENT)
Dept: ENDOCRINOLOGY CLINIC | Facility: CLINIC | Age: 50
End: 2025-01-23

## 2025-01-23 DIAGNOSIS — E11.9 TYPE 2 DIABETES MELLITUS WITHOUT COMPLICATION, WITH LONG-TERM CURRENT USE OF INSULIN (HCC): Primary | ICD-10-CM

## 2025-01-23 DIAGNOSIS — Z79.4 TYPE 2 DIABETES MELLITUS WITHOUT COMPLICATION, WITH LONG-TERM CURRENT USE OF INSULIN (HCC): Primary | ICD-10-CM

## 2025-01-23 RX ORDER — INSULIN GLARGINE-YFGN 100 [IU]/ML
46 INJECTION, SOLUTION SUBCUTANEOUS DAILY
Qty: 41 ML | Refills: 0 | Status: SHIPPED | OUTPATIENT
Start: 2025-01-23 | End: 2025-04-16

## 2025-01-23 NOTE — TELEPHONE ENCOUNTER
Per pharmacist Semglee is not going through by insurance due to prescriber not covered.     Rx sent under MD    Called pharmacist to check status, per tech patient needs to add insurance. RN provided insurance on file. Went through with [facility] PPO for $66 3 months supply. Rx is in stock and will be ready later today    Left voice message to patient to notify

## 2025-01-23 NOTE — TELEPHONE ENCOUNTER
Patient Venezuelan speaking is requesting for refill Insulin Glargine-yfgn (SEMGLEE, YFGN,) 100 UNIT/ML Subcutaneous Solution Pen-injector is out of it please follow up

## 2025-01-24 RX ORDER — PEN NEEDLE, DIABETIC 31 GX5/16"
NEEDLE, DISPOSABLE MISCELLANEOUS
Qty: 200 EACH | Refills: 1 | Status: SHIPPED | OUTPATIENT
Start: 2025-01-24

## 2025-01-24 NOTE — TELEPHONE ENCOUNTER
Endocrine Refill protocol for Glucose testing supplies     Protocol Criteria: PASSED Reason: N/A    If below requirement is met, send a 90-day supply with 1 refill per provider protocol.    Verify appointment with Endocrinology completed in the last 6 months or scheduled in the next 3 months.    Last completed office visit: 12/11/2024 Yenny Schumacher APRN   Next scheduled Follow up:   Future Appointments   Date Time Provider Department Center   4/14/2025  9:15 AM Yenny Schumacher APRN [facility]

## 2025-02-13 RX ORDER — DULAGLUTIDE 3 MG/.5ML
3 INJECTION, SOLUTION SUBCUTANEOUS WEEKLY
Qty: 12 ML | Refills: 0 | Status: SHIPPED | OUTPATIENT
Start: 2025-02-13

## 2025-02-13 NOTE — TELEPHONE ENCOUNTER
Endocrine Refill protocol for oral and injectable diabetic medications    Protocol Criteria:  PASSED  Reason: N/A    If all below requirements are met, send a 90-day supply with 1 refill per provider protocol.    Verify appointment with Endocrinology completed in the last 6 months or scheduled in the next 3 months.  Verify A1C has been completed within the last 6 months and is below 8.5%     Last completed office visit: 12/11/2024 Yenny Schumacher APRN   Next scheduled Follow up:   Future Appointments   Date Time Provider Department Center   4/14/2025  9:15 AM Yenny Schumacher APRN [facility]      Last A1c result: Last A1c value was 6.3% done 12/11/2024.

## 2025-03-06 NOTE — TELEPHONE ENCOUNTER
Demarcus Stein,     Since this patient would need to get A1C done at the lab, do you want to order other labs? Pended labs for your review if agreeable. Please route back to the pool as patient would need to be called.   Not Mychart active    Future Appointments   Date Time Provider Monika Lombardo   4/26/2023  2:30 PM Marylynn Duverney, APRN [facility] Physical Therapy Daily Treatment Note  [address], [address], [place] [address]      Patient: Ana Luisa Quezada   : 2010  Referring practitioner: Jonathan Souza MD  Date of Initial Visit: Type: THERAPY  Noted: 2025  Today's Date: 3/5/2025  Patient seen for 6 sessions       Visit Diagnoses:    ICD-10-CM ICD-9-CM   1. Closed patellar dislocation, left, sequela  S83.005S 905.6       Subjective   Patient reports no new issues.  Continues to have a desire to return to recreational activities.  his pain level is 0/10 upon arrival.      Objective   See Exercise, Manual, and Modality Logs for complete treatment.       Assessment & Plan       Assessment  Assessment details: Patient is demonstrating significant improvements in his knee and quad control.  Continues to progress well.  Will continue to ramp up higher level activities.  Cleared for throwing and swinging but out of a practice setting.           Timed:         Manual Therapy:    0     mins  92179;     Therapeutic Exercise:    25     mins  08945;     Neuromuscular Fito:    15    mins  31195;    Therapeutic Activity:     15     mins  24882;     Gait Trainin     mins  55043;     Ultrasound:     0     mins  29605;    Ionto                               0    mins   16765  Self Care                       0     mins   45445  Canalith Repos    00     mins 50835      Un-Timed:  Electrical Stimulation:    0     mins  50813 ( );  Dry Needling     0     mins self-pay  Traction     00     mins 62042      Timed Treatment:   55   mins   Total Treatment:     55   mins    Joselo Katz, PT  [place] License: [id number]

## 2025-04-16 DIAGNOSIS — Z79.4 TYPE 2 DIABETES MELLITUS WITHOUT COMPLICATION, WITH LONG-TERM CURRENT USE OF INSULIN (HCC): ICD-10-CM

## 2025-04-16 DIAGNOSIS — E11.9 TYPE 2 DIABETES MELLITUS WITHOUT COMPLICATION, WITH LONG-TERM CURRENT USE OF INSULIN (HCC): ICD-10-CM

## 2025-04-16 RX ORDER — INSULIN GLARGINE-YFGN 100 [IU]/ML
46 INJECTION, SOLUTION SUBCUTANEOUS DAILY
Qty: 42 ML | Refills: 0 | Status: SHIPPED | OUTPATIENT
Start: 2025-04-16

## 2025-04-16 NOTE — TELEPHONE ENCOUNTER
Endocrine refill protocol for basal insulins     Protocol Criteria: PASSED Reason: N/A    If all below requirements are met, send a 90-day supply with 1 refill per provider protocol.       Verify Appointment with Endocrinology completed in the last 6 months or scheduled in the next 3 months.  Verify A1C has been completed within the last 6 months and is below 8.5%     Last completed office visit:12/11/2024 Yenny Schuamcher APRN   Next scheduled Follow up: No future appointments.   Last A1c result: Last A1c value was 6.3% done 12/11/2024.

## 2025-05-04 ENCOUNTER — APPOINTMENT (OUTPATIENT)
Dept: CT IMAGING | Facility: HOSPITAL | Age: 50
End: 2025-05-04
Attending: EMERGENCY MEDICINE
Payer: COMMERCIAL

## 2025-05-04 ENCOUNTER — HOSPITAL ENCOUNTER (OUTPATIENT)
Age: 50
Discharge: ANOTHER HOSPITAL | End: 2025-05-04
Payer: COMMERCIAL

## 2025-05-04 ENCOUNTER — HOSPITAL ENCOUNTER (EMERGENCY)
Facility: HOSPITAL | Age: 50
Discharge: HOME | End: 2025-05-04
Attending: EMERGENCY MEDICINE
Payer: COMMERCIAL

## 2025-05-04 VITALS
OXYGEN SATURATION: 96 % | DIASTOLIC BLOOD PRESSURE: 76 MMHG | TEMPERATURE: 98 F | RESPIRATION RATE: 18 BRPM | HEART RATE: 72 BPM | SYSTOLIC BLOOD PRESSURE: 120 MMHG

## 2025-05-04 VITALS
RESPIRATION RATE: 16 BRPM | OXYGEN SATURATION: 100 % | HEART RATE: 84 BPM | DIASTOLIC BLOOD PRESSURE: 87 MMHG | SYSTOLIC BLOOD PRESSURE: 156 MMHG | TEMPERATURE: 98 F

## 2025-05-04 DIAGNOSIS — R03.0 ELEVATED BLOOD PRESSURE READING: ICD-10-CM

## 2025-05-04 DIAGNOSIS — R51.9 ACUTE NONINTRACTABLE HEADACHE, UNSPECIFIED HEADACHE TYPE: Primary | ICD-10-CM

## 2025-05-04 LAB
ALBUMIN SERPL-MCNC: 4.6 G/DL (ref 3.2–4.8)
ALP LIVER SERPL-CCNC: 51 U/L (ref 45–117)
ALT SERPL-CCNC: 35 U/L (ref 10–49)
ANION GAP SERPL CALC-SCNC: 8 MMOL/L (ref 0–18)
AST SERPL-CCNC: 22 U/L (ref ?–34)
BASOPHILS # BLD AUTO: 0.03 X10(3) UL (ref 0–0.2)
BASOPHILS NFR BLD AUTO: 0.4 %
BILIRUB DIRECT SERPL-MCNC: 0.2 MG/DL (ref ?–0.3)
BILIRUB SERPL-MCNC: 0.6 MG/DL (ref 0.3–1.2)
BUN BLD-MCNC: 14 MG/DL (ref 9–23)
BUN/CREAT SERPL: 15.7 (ref 10–20)
CALCIUM BLD-MCNC: 9.4 MG/DL (ref 8.7–10.4)
CHLORIDE SERPL-SCNC: 105 MMOL/L (ref 98–112)
CO2 SERPL-SCNC: 28 MMOL/L (ref 21–32)
CREAT BLD-MCNC: 0.89 MG/DL (ref 0.7–1.3)
DEPRECATED RDW RBC AUTO: 34.8 FL (ref 35.1–46.3)
EGFRCR SERPLBLD CKD-EPI 2021: 106 ML/MIN/1.73M2 (ref 60–?)
EOSINOPHIL # BLD AUTO: 0.1 X10(3) UL (ref 0–0.7)
EOSINOPHIL NFR BLD AUTO: 1.3 %
ERYTHROCYTE [DISTWIDTH] IN BLOOD BY AUTOMATED COUNT: 11.8 % (ref 11–15)
GLUCOSE BLD-MCNC: 149 MG/DL (ref 70–99)
HCT VFR BLD AUTO: 46 % (ref 39–53)
HGB BLD-MCNC: 15.9 G/DL (ref 13–17.5)
IMM GRANULOCYTES # BLD AUTO: 0.02 X10(3) UL (ref 0–1)
IMM GRANULOCYTES NFR BLD: 0.3 %
LYMPHOCYTES # BLD AUTO: 2.87 X10(3) UL (ref 1–4)
LYMPHOCYTES NFR BLD AUTO: 36.1 %
MCH RBC QN AUTO: 28.3 PG (ref 26–34)
MCHC RBC AUTO-ENTMCNC: 34.6 G/DL (ref 31–37)
MCV RBC AUTO: 82 FL (ref 80–100)
MONOCYTES # BLD AUTO: 0.59 X10(3) UL (ref 0.1–1)
MONOCYTES NFR BLD AUTO: 7.4 %
NEUTROPHILS # BLD AUTO: 4.34 X10 (3) UL (ref 1.5–7.7)
NEUTROPHILS # BLD AUTO: 4.34 X10(3) UL (ref 1.5–7.7)
NEUTROPHILS NFR BLD AUTO: 54.5 %
OSMOLALITY SERPL CALC.SUM OF ELEC: 295 MOSM/KG (ref 275–295)
PLATELET # BLD AUTO: 267 10(3)UL (ref 150–450)
POTASSIUM SERPL-SCNC: 4.2 MMOL/L (ref 3.5–5.1)
PROT SERPL-MCNC: 7.4 G/DL (ref 5.7–8.2)
RBC # BLD AUTO: 5.61 X10(6)UL (ref 4.3–5.7)
SODIUM SERPL-SCNC: 141 MMOL/L (ref 136–145)
WBC # BLD AUTO: 8 X10(3) UL (ref 4–11)

## 2025-05-04 PROCEDURE — 96374 THER/PROPH/DIAG INJ IV PUSH: CPT

## 2025-05-04 PROCEDURE — 80076 HEPATIC FUNCTION PANEL: CPT

## 2025-05-04 PROCEDURE — 96375 TX/PRO/DX INJ NEW DRUG ADDON: CPT

## 2025-05-04 PROCEDURE — 80048 BASIC METABOLIC PNL TOTAL CA: CPT | Performed by: EMERGENCY MEDICINE

## 2025-05-04 PROCEDURE — 80048 BASIC METABOLIC PNL TOTAL CA: CPT

## 2025-05-04 PROCEDURE — 70450 CT HEAD/BRAIN W/O DYE: CPT | Performed by: EMERGENCY MEDICINE

## 2025-05-04 PROCEDURE — 80076 HEPATIC FUNCTION PANEL: CPT | Performed by: EMERGENCY MEDICINE

## 2025-05-04 PROCEDURE — 99285 EMERGENCY DEPT VISIT HI MDM: CPT

## 2025-05-04 PROCEDURE — 85025 COMPLETE CBC W/AUTO DIFF WBC: CPT

## 2025-05-04 PROCEDURE — 99284 EMERGENCY DEPT VISIT MOD MDM: CPT

## 2025-05-04 PROCEDURE — 96361 HYDRATE IV INFUSION ADD-ON: CPT

## 2025-05-04 PROCEDURE — 85025 COMPLETE CBC W/AUTO DIFF WBC: CPT | Performed by: EMERGENCY MEDICINE

## 2025-05-04 RX ORDER — METOCLOPRAMIDE HYDROCHLORIDE 5 MG/ML
10 INJECTION INTRAMUSCULAR; INTRAVENOUS ONCE
Status: COMPLETED | OUTPATIENT
Start: 2025-05-04 | End: 2025-05-04

## 2025-05-04 RX ORDER — DIPHENHYDRAMINE HYDROCHLORIDE 50 MG/ML
12.5 INJECTION, SOLUTION INTRAMUSCULAR; INTRAVENOUS ONCE
Status: COMPLETED | OUTPATIENT
Start: 2025-05-04 | End: 2025-05-04

## 2025-05-04 RX ORDER — LOVASTATIN 10 MG/1
10 TABLET ORAL NIGHTLY
COMMUNITY

## 2025-05-04 NOTE — DISCHARGE INSTRUCTIONS
Take ibuprofen and/or Tylenol at the present of headache.    Push fluids and get rest.    See primary care for follow-up in the next 1 to 2 weeks.    Return to the ER if you develop worsening symptoms or any emergent concerns.

## 2025-05-04 NOTE — ED PROVIDER NOTES
Patient Seen in: [facility]    History     Chief Complaint   Patient presents with    Headache     Stated Complaint: Headache    HPI    49-year-old male presents with chief complaint of headache.  Onset 1 hour prior to arrival.  Patient reports sudden onset of severe headache.  Pain scale 10/10 on onset.  Patient reports associated dizziness and diaphoresis.  Patient denies chest pain or dyspnea.  Patient denies injury or trauma, neck pain or injury, loss of consciousness, altered mental status, nausea, vomiting, amnesia, weakness, paresthesias, vision changes, decreased range of motion of extremities.    Past Medical History[1]    Past Surgical History[2]         Family History[3]    Short Social Hx on File[4]    Review of Systems    Positive for stated complaint: Headache  Other systems are as noted in HPI.  Constitutional and vital signs reviewed.      All other systems reviewed and negative except as noted above.    PSFH elements reviewed from today and agreed except as otherwise stated in HPI.    Physical Exam     ED Triage Vitals [05/04/25 1103]   /87   Pulse 84   Resp 16   Temp 98 °F (36.7 °C)   Temp src Oral   SpO2 100 %   O2 Device None (Room air)       Current:/87   Pulse 84   Temp 98 °F (36.7 °C) (Oral)   Resp 16   SpO2 100%     PULSE OX within normal limits on room air as interpreted by this provider.    Constitutional: The patient is cooperative. Appears well-developed and well-nourished.  Mild discomfort.  Psychological: Alert, No abnormalities of mood, affect.  Head: Normocephalic/atraumatic. Nontender.   Eyes: Pupils are equal round reactive to light. Conjunctiva are within normal limits. Extraocular motions intact bilaterally.    ENT: Oropharynx is clear.  TMs within normal limits bilaterally.  Neck: The neck is supple. No meningeal signs. Trachea normal. Nontender to palpation. No contusions. No abrasions. No penetrating injury.  Respiratory: Respiratory effort was  normal. There is no stridor. Air entry is equal.  Cardiovascular: Regular rate and rhythm. Capillary refill is brisk.  Genitourinary: Not examined.  Lymphatic: No gross lymphadenopathy noted.  Musculoskeletal: Musculoskeletal system is grossly intact. There is no obvious deformity.  Neurological: No facial asymmetry.  Normal gait.  Normal sensory exam.  Patient exhibits normal speech.  Strength and range of motion symmetrical of all extremities x4.  Skin: Skin is normal to inspection and palpation. Warm and dry. No obvious bruising. No obvious rash.          ED Course   Labs Reviewed - No data to display    MDM      via [employer] utilized to communicate with patient.    Physical exam remained stable as previously documented.  Physical exam findings discussed with patient.    49-year-old male presents with chief complaint of sudden onset severe headache that began approximately 1 hour ago.  Patient reports associated dizziness and diaphoresis.  Physical exam as documented above.  Discussed with patient need for further evaluation and possible workup in emergency department.  Patient is agreeable.    Patient case discussed with Dr. Valdez.      Disposition and Plan     Clinical Impression:  1. Acute nonintractable headache, unspecified headache type    2. Elevated blood pressure reading        Disposition:  Ic to ed    Follow-up:  No follow-up provider specified.    Medications Prescribed:  Discharge Medication List as of 5/4/2025 11:24 AM                         [1]   Past Medical History:   Diabetes (HCC)    Essential hypertension   [2]   Past Surgical History:  Procedure Laterality Date    Hernia surgery      around 2006   [3]   Family History  Problem Relation Age of Onset    Diabetes Mother     Diabetes Sister    [4]   Social History  Socioeconomic History    Marital status:    Tobacco Use    Smoking status: Former     Current packs/day: 0.00     Types: Cigarettes     Quit  date: 10/1/1991     Years since quittin.6    Smokeless tobacco: Never   Vaping Use    Vaping status: Never Used   Substance and Sexual Activity    Alcohol use: Not Currently    Drug use: Never     Social Drivers of Health     Food Insecurity: No Food Insecurity (2024)    Received from [facility]    Food Insecurity     Within the past 30 days, I worried whether my food would run out before I got money to buy more. / En los últimos 30 días, me preocupó que la comida se podía acabar antes de tener dinero para compr...: Never true / Nunca     Within the past 30 days, the food that I bought just didn't last, and I didn't have money to get more. / En los últimos 30 días, La comida que compré no rindió lo suficiente, y no tenía dinero para...: Never true / Nunca

## 2025-05-04 NOTE — ED PROVIDER NOTES
Patient Seen in: [facility] Emergency Department      History   No chief complaint on file.    Stated Complaint: Headache, dizziness    Subjective:   HPI    49-year-old male presents for evaluation of headache.  Patient reports acute onset severe headache at 10 AM this morning.  Pain has improved somewhat since arrival to the ER.  No meds at home, denies recent trauma, fever, focal weakness or numbness.  Did have mild dizziness when the headache started.  No vomiting.    History of Present Illness               Objective:     Past Medical History:    Diabetes (HCC)    Essential hypertension              Past Surgical History:   Procedure Laterality Date    Hernia surgery      around                 Social History     Socioeconomic History    Marital status:    Tobacco Use    Smoking status: Former     Current packs/day: 0.00     Types: Cigarettes     Quit date: 10/1/1991     Years since quittin.6    Smokeless tobacco: Never   Vaping Use    Vaping status: Never Used   Substance and Sexual Activity    Alcohol use: Not Currently    Drug use: Never     Social Drivers of Health     Food Insecurity: No Food Insecurity (2024)    Received from [facility]    Food Insecurity     Within the past 30 days, I worried whether my food would run out before I got money to buy more. / En los últimos 30 días, me preocupó que la comida se podía acabar antes de tener dinero para compr...: Never true / Nunca     Within the past 30 days, the food that I bought just didn't last, and I didn't have money to get more. / En los últimos 30 días, La comida que compré no rindió lo suficiente, y no tenía dinero para...: Never true / Nunca                                Physical Exam     ED Triage Vitals [25 1203]   BP (!) 151/95   Pulse 78   Resp 18   Temp 98 °F (36.7 °C)   Temp src Oral   SpO2 98 %   O2 Device None (Room air)       Current Vitals:   Vital Signs  BP: 120/76  Pulse: 72  Resp:  18  Temp: 98 °F (36.7 °C)  Temp src: Oral  MAP (mmHg): 89    Oxygen Therapy  SpO2: 96 %  O2 Device: None (Room air)        Physical Exam  Vitals and nursing note reviewed.   Constitutional:       General: He is not in acute distress.     Appearance: He is well-developed.   HENT:      Head: Normocephalic and atraumatic.   Eyes:      Extraocular Movements: Extraocular movements intact.      Conjunctiva/sclera: Conjunctivae normal.      Pupils: Pupils are equal, round, and reactive to light.   Cardiovascular:      Rate and Rhythm: Normal rate and regular rhythm.      Heart sounds: Normal heart sounds.   Pulmonary:      Effort: Pulmonary effort is normal. No respiratory distress.      Breath sounds: Normal breath sounds.   Abdominal:      General: Bowel sounds are normal.      Palpations: Abdomen is soft.      Tenderness: There is no abdominal tenderness.   Musculoskeletal:         General: Normal range of motion.      Cervical back: Normal range of motion and neck supple.   Skin:     General: Skin is warm and dry.      Findings: No rash.   Neurological:      General: No focal deficit present.      Mental Status: He is alert and oriented to person, place, and time.      Cranial Nerves: No cranial nerve deficit.      Sensory: No sensory deficit.      Motor: No weakness.           Physical Exam                ED Course     Labs Reviewed   CBC WITH DIFFERENTIAL WITH PLATELET - Abnormal; Notable for the following components:       Result Value    RDW-SD 34.8 (*)     All other components within normal limits   BASIC METABOLIC PANEL (8) - Abnormal; Notable for the following components:    Glucose 149 (*)     All other components within normal limits   HEPATIC FUNCTION PANEL (7) - Normal          Results                       CT BRAIN OR HEAD (CPT=70450)  Result Date: 5/4/2025  CONCLUSION:  1. No acute intracranial process by noncontrast CT technique.  2. Acute sphenoid sinusitis is suggested.  3. Lesser incidental findings as  above.    Dictated by (CST): Sandro Alonzo MD on 5/04/2025 at 1:55 PM     Finalized by (CST): Sandro Alonzo MD on 5/04/2025 at 1:56 PM              [facility]              Medical Decision Making  Differential diagnosis includes but is not limited to tension headache, cluster headache, dehydration, migraine, aneurysm, subarachnoid hemorrhage, malignancy    CT negative, obtained within 6 hours of onset of headache, low suspicion for subarachnoid hemorrhage.  Patient reports feeling much better after IV fluids, Reglan, Benadryl, comfortable plan for discharge home and outpatient follow-up, agrees to strict return precautions.  Patient and wife at the bedside verbalized understanding of and agreement with this plan    Problems Addressed:  Acute nonintractable headache, unspecified headache type: acute illness or injury     Details: Differential diagnoses encompassed high-risk conditions with potential threats to life, limb, or major bodily functions, warranting comprehensive evaluation and high-complexity medical decision-making.      Amount and/or Complexity of Data Reviewed  External Data Reviewed: labs.     Details: CBC and CMP from 11/14/2024 reviewed, creatinine 0.85, hemoglobin 15.7  Labs: ordered.  Radiology: ordered and independent interpretation performed.     Details: CT images reviewed, no obvious intracranial hemorrhage, other and incidental findings deferred to [facility]    Risk  OTC drugs.        Disposition and Plan     Clinical Impression:  1. Acute nonintractable headache, unspecified headache type         Disposition:  Discharge  5/4/2025  4:04 pm    Follow-up:  No follow-up provider specified.        Medications Prescribed:  Discharge Medication List as of 5/4/2025  4:36 PM          Supplementary Documentation:

## 2025-05-04 NOTE — ED INITIAL ASSESSMENT (HPI)
Pt reports a sudden onset headache pain 10/10 which happened 1 hour ago with associated dizziness and \"I was sweating\". Denies chest pain or SOB. States headache lasted about 30 nolvia and was relieved with an ice pack and lying down.   Reports headache pain 2/10 currently.

## 2025-05-04 NOTE — ED INITIAL ASSESSMENT (HPI)
Pt to ED for c/o intense headache that occur 40min PTA, dizziness, denies blur vision, no n/v. Pt reports when he walks he feels like he is on a boat. Denies blood thinner

## 2025-06-10 RX ORDER — METFORMIN HYDROCHLORIDE 500 MG/1
500 TABLET, EXTENDED RELEASE ORAL 2 TIMES DAILY WITH MEALS
Qty: 180 TABLET | Refills: 1 | Status: SHIPPED | OUTPATIENT
Start: 2025-06-10

## 2025-06-10 NOTE — TELEPHONE ENCOUNTER
Endocrine Refill protocol for metformin    Protocol Criteria:  FAILED  Reason: No Visit in required time frame    If all below requirements are met, send a 90-day supply with 1 refill per provider protocol.     Verify appointment with Endocrinology completed in the last 6 months or scheduled in the next 3 months.  Verify A1C has been completed within the last 6 months and is below 8.5%  Verify last GFR is greater than or equal to 40 in the past 12 months    Last completed office visit:12/11/2024 Yenny Schumacher APRN   Last completed telemed visit: Visit date not found  Next scheduled Follow up: No future appointments.    Last GFR result:    Lab Results   Component Value Date    EGFRCR 106 05/04/2025     Last A1c result: Last A1C result: 6.3% done 12/11/2024.

## 2025-07-22 DIAGNOSIS — Z79.4 TYPE 2 DIABETES MELLITUS WITHOUT COMPLICATION, WITH LONG-TERM CURRENT USE OF INSULIN (HCC): ICD-10-CM

## 2025-07-22 DIAGNOSIS — E11.9 TYPE 2 DIABETES MELLITUS WITHOUT COMPLICATION, WITH LONG-TERM CURRENT USE OF INSULIN (HCC): ICD-10-CM

## 2025-07-24 RX ORDER — INSULIN GLARGINE-YFGN 100 [IU]/ML
46 INJECTION, SOLUTION SUBCUTANEOUS DAILY
Qty: 41.4 ML | Refills: 0 | Status: SHIPPED | OUTPATIENT
Start: 2025-07-24

## 2025-07-24 RX ORDER — INSULIN DETEMIR 100 [IU]/ML
46 INJECTION, SOLUTION SUBCUTANEOUS NIGHTLY
Qty: 41.4 ML | Refills: 1 | Status: SHIPPED | OUTPATIENT
Start: 2025-07-24

## 2025-07-24 NOTE — TELEPHONE ENCOUNTER
Endocrine refill protocol for basal insulins     Protocol Criteria: FAILED Reason: No Visit in required time frame mcm sent    If all below requirements are met, send a 90-day supply with 1 refill per provider protocol.       Verify Appointment with Endocrinology completed in the last 6 months or scheduled in the next 3 months.  Verify A1C has been completed within the last 6 months and is below 8.5%     Last completed office visit:12/11/2024 Yenny Schumacher APRN   Last completed telemed visit: Visit date not found  Next scheduled Follow up: No future appointments. [facility] sent  Last A1c result: Last A1c value was 6.3% done 12/11/2024.